# Patient Record
Sex: FEMALE | Race: WHITE | NOT HISPANIC OR LATINO | Employment: OTHER | URBAN - METROPOLITAN AREA
[De-identification: names, ages, dates, MRNs, and addresses within clinical notes are randomized per-mention and may not be internally consistent; named-entity substitution may affect disease eponyms.]

---

## 2024-08-06 ENCOUNTER — HOSPITAL ENCOUNTER (INPATIENT)
Facility: CLINIC | Age: 89
LOS: 5 days | Discharge: NURSING, REHAB OR LONG-TERM CARE | DRG: 522 | End: 2024-08-11
Attending: EMERGENCY MEDICINE | Admitting: HOSPITALIST
Payer: COMMERCIAL

## 2024-08-06 ENCOUNTER — APPOINTMENT (OUTPATIENT)
Dept: GENERAL RADIOLOGY | Facility: CLINIC | Age: 89
DRG: 522 | End: 2024-08-06
Attending: EMERGENCY MEDICINE
Payer: COMMERCIAL

## 2024-08-06 ENCOUNTER — APPOINTMENT (OUTPATIENT)
Dept: GENERAL RADIOLOGY | Facility: CLINIC | Age: 89
DRG: 522 | End: 2024-08-06
Attending: PHYSICIAN ASSISTANT
Payer: COMMERCIAL

## 2024-08-06 DIAGNOSIS — S32.592A CLOSED FRACTURE OF RAMUS OF LEFT PUBIS, INITIAL ENCOUNTER (H): ICD-10-CM

## 2024-08-06 DIAGNOSIS — S72.002A CLOSED DISPLACED FRACTURE OF LEFT FEMORAL NECK (H): ICD-10-CM

## 2024-08-06 DIAGNOSIS — E87.1 HYPONATREMIA: ICD-10-CM

## 2024-08-06 DIAGNOSIS — S72.002A CLOSED FRACTURE OF NECK OF LEFT FEMUR, INITIAL ENCOUNTER (H): Primary | ICD-10-CM
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
## 2024-08-06 PROBLEM — M80.00XD AGE-RELATED OSTEOPOROSIS WITH CURRENT PATHOLOGICAL FRACTURE WITH ROUTINE HEALING: Status: ACTIVE | Noted: 2024-08-06
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
## 2024-08-06 PROBLEM — S72.102D: Status: ACTIVE | Noted: 2024-08-06

## 2024-08-06 LAB
ABO/RH(D): NORMAL
ANION GAP SERPL CALCULATED.3IONS-SCNC: 12 MMOL/L (ref 7–15)
ANTIBODY SCREEN: NEGATIVE
ATRIAL RATE - MUSE: 97 BPM
BASOPHILS # BLD AUTO: 0 10E3/UL (ref 0–0.2)
BASOPHILS NFR BLD AUTO: 0 %
BUN SERPL-MCNC: 15.9 MG/DL (ref 8–23)
CALCIUM SERPL-MCNC: 9 MG/DL (ref 8.8–10.4)
CHLORIDE SERPL-SCNC: 92 MMOL/L (ref 98–107)
CREAT SERPL-MCNC: 0.59 MG/DL (ref 0.51–0.95)
DIASTOLIC BLOOD PRESSURE - MUSE: NORMAL MMHG
EGFRCR SERPLBLD CKD-EPI 2021: 85 ML/MIN/1.73M2
EOSINOPHIL # BLD AUTO: 0.1 10E3/UL (ref 0–0.7)
EOSINOPHIL NFR BLD AUTO: 1 %
ERYTHROCYTE [DISTWIDTH] IN BLOOD BY AUTOMATED COUNT: 13.7 % (ref 10–15)
GLUCOSE SERPL-MCNC: 142 MG/DL (ref 70–99)
HCO3 SERPL-SCNC: 23 MMOL/L (ref 22–29)
HCT VFR BLD AUTO: 41.8 % (ref 35–47)
HGB BLD-MCNC: 14.5 G/DL (ref 11.7–15.7)
IMM GRANULOCYTES # BLD: 0.1 10E3/UL
IMM GRANULOCYTES NFR BLD: 1 %
INTERPRETATION ECG - MUSE: NORMAL
LYMPHOCYTES # BLD AUTO: 0.9 10E3/UL (ref 0.8–5.3)
LYMPHOCYTES NFR BLD AUTO: 8 %
MCH RBC QN AUTO: 30.3 PG (ref 26.5–33)
MCHC RBC AUTO-ENTMCNC: 34.7 G/DL (ref 31.5–36.5)
MCV RBC AUTO: 87 FL (ref 78–100)
MONOCYTES # BLD AUTO: 0.9 10E3/UL (ref 0–1.3)
MONOCYTES NFR BLD AUTO: 8 %
NEUTROPHILS # BLD AUTO: 9.8 10E3/UL (ref 1.6–8.3)
NEUTROPHILS NFR BLD AUTO: 84 %
NRBC # BLD AUTO: 0 10E3/UL
NRBC BLD AUTO-RTO: 0 /100
P AXIS - MUSE: 75 DEGREES
PLATELET # BLD AUTO: 182 10E3/UL (ref 150–450)
POTASSIUM SERPL-SCNC: 3.8 MMOL/L (ref 3.4–5.3)
PR INTERVAL - MUSE: 140 MS
QRS DURATION - MUSE: 84 MS
QT - MUSE: 362 MS
QTC - MUSE: 459 MS
R AXIS - MUSE: 32 DEGREES
RBC # BLD AUTO: 4.79 10E6/UL (ref 3.8–5.2)
SODIUM SERPL-SCNC: 127 MMOL/L (ref 135–145)
SPECIMEN EXPIRATION DATE: NORMAL
SYSTOLIC BLOOD PRESSURE - MUSE: NORMAL MMHG
T AXIS - MUSE: 49 DEGREES
TSH SERPL DL<=0.005 MIU/L-ACNC: 5.39 UIU/ML (ref 0.3–4.2)
VENTRICULAR RATE- MUSE: 97 BPM
VIT D+METAB SERPL-MCNC: 119 NG/ML (ref 20–50)
WBC # BLD AUTO: 11.8 10E3/UL (ref 4–11)

## 2024-08-06 PROCEDURE — 99222 1ST HOSP IP/OBS MODERATE 55: CPT | Performed by: PHYSICIAN ASSISTANT

## 2024-08-06 PROCEDURE — 93005 ELECTROCARDIOGRAM TRACING: CPT

## 2024-08-06 PROCEDURE — 80048 BASIC METABOLIC PNL TOTAL CA: CPT | Performed by: EMERGENCY MEDICINE

## 2024-08-06 PROCEDURE — 86900 BLOOD TYPING SEROLOGIC ABO: CPT | Performed by: PHYSICIAN ASSISTANT

## 2024-08-06 PROCEDURE — 82306 VITAMIN D 25 HYDROXY: CPT | Performed by: PHYSICIAN ASSISTANT

## 2024-08-06 PROCEDURE — 85025 COMPLETE CBC W/AUTO DIFF WBC: CPT | Performed by: EMERGENCY MEDICINE

## 2024-08-06 PROCEDURE — 250N000013 HC RX MED GY IP 250 OP 250 PS 637: Performed by: PHYSICIAN ASSISTANT

## 2024-08-06 PROCEDURE — 71045 X-RAY EXAM CHEST 1 VIEW: CPT

## 2024-08-06 PROCEDURE — 84443 ASSAY THYROID STIM HORMONE: CPT | Performed by: PHYSICIAN ASSISTANT

## 2024-08-06 PROCEDURE — 36415 COLL VENOUS BLD VENIPUNCTURE: CPT | Performed by: PHYSICIAN ASSISTANT

## 2024-08-06 PROCEDURE — 120N000001 HC R&B MED SURG/OB

## 2024-08-06 PROCEDURE — 72170 X-RAY EXAM OF PELVIS: CPT

## 2024-08-06 PROCEDURE — 99285 EMERGENCY DEPT VISIT HI MDM: CPT | Mod: 25

## 2024-08-06 PROCEDURE — 73552 X-RAY EXAM OF FEMUR 2/>: CPT | Mod: LT

## 2024-08-06 RX ORDER — ONDANSETRON 2 MG/ML
4 INJECTION INTRAMUSCULAR; INTRAVENOUS EVERY 6 HOURS PRN
Status: DISCONTINUED | OUTPATIENT
Start: 2024-08-06 | End: 2024-08-06

## 2024-08-06 RX ORDER — HYDRALAZINE HYDROCHLORIDE 20 MG/ML
10 INJECTION INTRAMUSCULAR; INTRAVENOUS EVERY 4 HOURS PRN
Status: DISCONTINUED | OUTPATIENT
Start: 2024-08-06 | End: 2024-08-06

## 2024-08-06 RX ORDER — GABAPENTIN 100 MG/1
200 CAPSULE ORAL 3 TIMES DAILY
COMMUNITY

## 2024-08-06 RX ORDER — AMOXICILLIN 250 MG
1 CAPSULE ORAL 2 TIMES DAILY PRN
Status: DISCONTINUED | OUTPATIENT
Start: 2024-08-06 | End: 2024-08-11 | Stop reason: HOSPADM

## 2024-08-06 RX ORDER — ACETAMINOPHEN 325 MG/1
650 TABLET ORAL
Status: DISCONTINUED | OUTPATIENT
Start: 2024-08-06 | End: 2024-08-11 | Stop reason: HOSPADM

## 2024-08-06 RX ORDER — HYDROMORPHONE HYDROCHLORIDE 1 MG/ML
0.3 INJECTION, SOLUTION INTRAMUSCULAR; INTRAVENOUS; SUBCUTANEOUS EVERY 6 HOURS PRN
Status: DISCONTINUED | OUTPATIENT
Start: 2024-08-06 | End: 2024-08-07

## 2024-08-06 RX ORDER — AMLODIPINE BESYLATE 10 MG/1
10 TABLET ORAL DAILY
Status: DISCONTINUED | OUTPATIENT
Start: 2024-08-07 | End: 2024-08-11 | Stop reason: HOSPADM

## 2024-08-06 RX ORDER — LIDOCAINE 40 MG/G
CREAM TOPICAL
Status: DISCONTINUED | OUTPATIENT
Start: 2024-08-06 | End: 2024-08-07

## 2024-08-06 RX ORDER — HYDROMORPHONE HCL IN WATER/PF 6 MG/30 ML
0.2 PATIENT CONTROLLED ANALGESIA SYRINGE INTRAVENOUS
Status: DISCONTINUED | OUTPATIENT
Start: 2024-08-06 | End: 2024-08-07

## 2024-08-06 RX ORDER — HYDROMORPHONE HYDROCHLORIDE 2 MG/1
2 TABLET ORAL EVERY 4 HOURS PRN
Status: DISCONTINUED | OUTPATIENT
Start: 2024-08-06 | End: 2024-08-10 | Stop reason: ALTCHOICE

## 2024-08-06 RX ORDER — HYDRALAZINE HYDROCHLORIDE 10 MG/1
10 TABLET, FILM COATED ORAL EVERY 4 HOURS PRN
Status: DISCONTINUED | OUTPATIENT
Start: 2024-08-06 | End: 2024-08-11 | Stop reason: HOSPADM

## 2024-08-06 RX ORDER — ONDANSETRON 4 MG/1
4 TABLET, ORALLY DISINTEGRATING ORAL EVERY 6 HOURS PRN
Status: DISCONTINUED | OUTPATIENT
Start: 2024-08-06 | End: 2024-08-11 | Stop reason: HOSPADM

## 2024-08-06 RX ORDER — PROCHLORPERAZINE MALEATE 5 MG
5 TABLET ORAL EVERY 6 HOURS PRN
Status: DISCONTINUED | OUTPATIENT
Start: 2024-08-06 | End: 2024-08-11 | Stop reason: HOSPADM

## 2024-08-06 RX ORDER — GABAPENTIN 100 MG/1
200 CAPSULE ORAL 3 TIMES DAILY
Status: DISCONTINUED | OUTPATIENT
Start: 2024-08-06 | End: 2024-08-11 | Stop reason: HOSPADM

## 2024-08-06 RX ORDER — LEVOTHYROXINE SODIUM 100 UG/1
100 TABLET ORAL DAILY
Status: DISCONTINUED | OUTPATIENT
Start: 2024-08-07 | End: 2024-08-11 | Stop reason: HOSPADM

## 2024-08-06 RX ORDER — ONDANSETRON 4 MG/1
4 TABLET, ORALLY DISINTEGRATING ORAL EVERY 6 HOURS PRN
Status: DISCONTINUED | OUTPATIENT
Start: 2024-08-06 | End: 2024-08-06

## 2024-08-06 RX ORDER — LEVOTHYROXINE SODIUM 100 UG/1
100 TABLET ORAL DAILY
COMMUNITY

## 2024-08-06 RX ORDER — CALCIUM CARBONATE 500 MG/1
1000 TABLET, CHEWABLE ORAL 4 TIMES DAILY PRN
Status: DISCONTINUED | OUTPATIENT
Start: 2024-08-06 | End: 2024-08-11 | Stop reason: HOSPADM

## 2024-08-06 RX ORDER — HYDROXYZINE HYDROCHLORIDE 10 MG/1
10 TABLET, FILM COATED ORAL 3 TIMES DAILY PRN
Status: DISCONTINUED | OUTPATIENT
Start: 2024-08-06 | End: 2024-08-06

## 2024-08-06 RX ORDER — AMOXICILLIN 250 MG
2 CAPSULE ORAL 2 TIMES DAILY PRN
Status: DISCONTINUED | OUTPATIENT
Start: 2024-08-06 | End: 2024-08-11 | Stop reason: HOSPADM

## 2024-08-06 RX ORDER — AMLODIPINE BESYLATE 10 MG/1
10 TABLET ORAL DAILY
COMMUNITY

## 2024-08-06 RX ORDER — HYDRALAZINE HYDROCHLORIDE 20 MG/ML
10 INJECTION INTRAMUSCULAR; INTRAVENOUS EVERY 6 HOURS PRN
Status: DISCONTINUED | OUTPATIENT
Start: 2024-08-06 | End: 2024-08-11 | Stop reason: HOSPADM

## 2024-08-06 RX ORDER — PROCHLORPERAZINE 25 MG
12.5 SUPPOSITORY, RECTAL RECTAL EVERY 12 HOURS PRN
Status: DISCONTINUED | OUTPATIENT
Start: 2024-08-06 | End: 2024-08-11 | Stop reason: HOSPADM

## 2024-08-06 RX ORDER — METHOCARBAMOL 500 MG/1
250 TABLET ORAL 3 TIMES DAILY PRN
Status: DISCONTINUED | OUTPATIENT
Start: 2024-08-06 | End: 2024-08-11 | Stop reason: HOSPADM

## 2024-08-06 RX ORDER — ACETAMINOPHEN 650 MG/1
650 SUPPOSITORY RECTAL
Status: DISCONTINUED | OUTPATIENT
Start: 2024-08-06 | End: 2024-08-11 | Stop reason: HOSPADM

## 2024-08-06 RX ORDER — FENTANYL CITRATE 50 UG/ML
100 INJECTION, SOLUTION INTRAMUSCULAR; INTRAVENOUS ONCE
Status: DISCONTINUED | OUTPATIENT
Start: 2024-08-06 | End: 2024-08-07

## 2024-08-06 RX ORDER — ONDANSETRON 2 MG/ML
4 INJECTION INTRAMUSCULAR; INTRAVENOUS EVERY 6 HOURS PRN
Status: DISCONTINUED | OUTPATIENT
Start: 2024-08-06 | End: 2024-08-11 | Stop reason: HOSPADM

## 2024-08-06 RX ADMIN — GABAPENTIN 200 MG: 100 CAPSULE ORAL at 20:56

## 2024-08-06 RX ADMIN — ACETAMINOPHEN 650 MG: 325 TABLET, FILM COATED ORAL at 20:56

## 2024-08-06 ASSESSMENT — ACTIVITIES OF DAILY LIVING (ADL)
ADLS_ACUITY_SCORE: 35
ADLS_ACUITY_SCORE: 33
ADLS_ACUITY_SCORE: 35
ADLS_ACUITY_SCORE: 33
ADLS_ACUITY_SCORE: 35
ADLS_ACUITY_SCORE: 25
ADLS_ACUITY_SCORE: 33
ADLS_ACUITY_SCORE: 35
ADLS_ACUITY_SCORE: 35
ADLS_ACUITY_SCORE: 33
ADLS_ACUITY_SCORE: 35
ADLS_ACUITY_SCORE: 35

## 2024-08-06 NOTE — ED NOTES
"[facility]  ED Nurse Handoff Report    ED Chief complaint: Hip Pain      ED Diagnosis:   Final diagnoses:   Closed fracture of neck of left femur, initial encounter (H)       Code Status: Full Code    Allergies:   Allergies   Allergen Reactions    Morphine And Codeine      States she almost passed out from medicine     Sulfa Antibiotics      Unknown reaction       Patient Story: pt fell in her home with left hip pain   Focused Assessment:  pt has a fx of left femur neck and will have surgery tomorrow     Treatments and/or interventions provided: supportive   Patient's response to treatments and/or interventions: well     To be done/followed up on inpatient unit:  please call son for an update     Does this patient have any cognitive concerns?: Forgetful    Activity level - Baseline/Home:  Walker  Activity Level - Current:   Total Care    Patient's Preferred language: English   Needed?: No    Isolation: None  Infection: Not Applicable  Patient tested for COVID 19 prior to admission: NO  Bariatric?: No    Vital Signs:   Vitals:    08/06/24 1036 08/06/24 1507   BP: 127/68 (!) 137/90   Pulse: 68    Resp: 18 19   Temp: 98.3  F (36.8  C)    TempSrc: Temporal    SpO2: 96% 92%   Weight: 57.6 kg (127 lb)    Height: 1.626 m (5' 4\")        Cardiac Rhythm:     Was the PSS-3 completed:   yes  What interventions are required if any?               Family Comments: Please call son for updates   OBS brochure/video discussed/provided to patient/family: No              Name of person given brochure if not patient:               Relationship to patient:     For the majority of the shift this patient's behavior was Green.   Behavioral interventions performed were .    ED NURSE PHONE NUMBER: *[phone]         "

## 2024-08-06 NOTE — H&P
[facility]  History and Physical - Hospitalist Service  Date of Admission:  8/6/2024  PRIMARY CARE PROVIDER:  Noreen Victor    Assessment & Plan           Assessment/Plan:      Glo Gallagher is a 89 year old female , with a PMH of osteoporosis, hypothyroidism, neuropathy, prediabetes, cognitive decline, who was admitted on 8/6/2024.,  After an accidental fall out of bed, with the left hip and pelvic fracture.    Per Ed MD, fall out of bed while trying to stand 8/5,  put her back into bed, stayed there x1d, presented to ED 8/6. XR w L hip and pelvic fx. Ortho plans for repair 8/7.      Principal Problem:    Closed trochanteric fracture of left femur with routine healing  Active Problems:    Osteoporosis    Hypertension goal BP (blood pressure) < 140/90    Fall as cause of accidental injury in home as place of occurrence, initial encounter    Closed fracture of multiple pubic rami, left, initial encounter (H)    Age-related osteoporosis with current pathological fracture with routine healing      Accidental fall, at home  Left femoral neck fracture with impaction, trauma related, osteoporosis related  Left superior/inferior pubic rami fractures,  Osteoporosis  Fell standing near closet (per pt) , NOT out of bed , reports leg gave way on 8/5,  put her back into bed, stayed there x1d, presented to ED 8/6. XR w L hip and sup. since and inf pelvic rami fx. Ortho plans for repair 8/7.  Suspect fractures are secondary to her osteoporosis + trauma.  No contraindications to proceed with repair on 8/7 as benefits>risks. Pain well controlled., caution w meds /SE risk on opiates.  -Admit inpatient  -orthopedics consulted  -N.p.o. at midnight  -Operative repair planned for 8/7  -Bedrest until repair  -Pain control with cold packs, acetaminophen, limit NSAIDs, low-dose as needed hydromorphone ,PO 1st, IV second prn, use lowest dose needed.  -Screening CXR, EKG  -AM CBC, BMP  -PTA  bisphosphonate held, resume per orthopedics    Hypothyroidism  , last was low 0.10..  -Recheck TSH 5.39, recheck in 4 wks  --PCP may need to adjust synthroid    Hypertension  On admission 137/90 here  -PTA amlodipine 10 daily resumed  -Hydralazine IV prn SBP consistently >160  -  History of cognitive impairment  -No PTA meds, intolerant past Aricept  -At risk of delirium  -As needed melatonin at bedtime  -Assure good sleep-wake cycles    History of prediabetes, peripheral neuropathy  -Last HgbA1c 6.1% 10/2023, no PTA meds.    -PCP follow-up  -PTA gabapentin 200 3 times daily    Clinically Significant Risk Factors Present on Admission                # Drug Induced Platelet Defect: home medication list includes an antiplatelet medication   # Hypertension: Noted on problem list                         Diet: NPO per Anesthesia Guidelines for Procedure/Surgery Except for: Meds, Ice Chips    DVT Prophylaxis: Pneumatic Compression Devices  Ortiz Catheter: Not present  Lines: None     Cardiac Monitoring: None  Code Status:  DNR/DNI per pt (clear re wishes)  Disposition: Inpt, expect > 2 midnights , surgery  Disposition Plan      The patient has been discussed in detail with the [facility] hospitalist, who agrees with the assessment and plan as noted  The patient's care was discussed with the Attending Physician,   , Bedside Nurse, Patient, and ED MD .  Call to son unanswered, no msg left  Call to  ([phone]) also not answered  Entered: Mendoza Barone PA-C 08/06/2024, 4:40 PM       Mendoza Barone PA-C  [facility]  Securely message with the Vocera Web Console (learn more here)  Text page via [employer] Paging/Directory     ____________________________________________________________________    Chief Complaint         Subjective:    L hip pain    History of Present Illness   History is obtained from the ED provider, patient and EMR.  Pt is considered a mostly reliable historian, has mild cog  impairment, recall re events seems intact, but is not assured and /family report would help. No answer in calls to son or  at home to confirm hx      Patient pleasantly reports that she is feeling okay.  At present she reports her pain is well-controlled and not at rest.  Locates it over the left lateral hip.  She does tell me about the fall but states she was up at her closet when she fell.  Unsure why she fell.  Fell onto her left hip.  Apparently did not hurt too much.  She was assisted back in bed by her  per her report.  Did not report any prodromal symptoms.  No chest pain shortness of breath.  No abdominal symptoms.  No UTI or dysuria symptoms.  No other new symptoms were endorsed.    Past medical history briefly reviewed.  She does indicate she has some memory difficulties,-Is aware of the correct 2024 and that she is at a hospital.  Struggles to remember [place].    I tried to call the son and the  but neither would  and unable to leave a message.    Past Medical History    I have reviewed this patient's medical history and updated it with pertinent information if needed.   Past Medical History:   Diagnosis Date    Allergic rhinitis, cause unspecified     Allergic rhinitis, cause unspecified     Hypertension goal BP (blood pressure) < 140/90     Lumbago     Osteopenia     11/3.3 % FRAX     thyroid [794.6]     thyroid     Prior to Admission Medications   Prior to Admission Medications   Prescriptions Last Dose Informant Patient Reported? Taking?   AMLODIPINE BESYLATE 5 MG PO TABS   No No   Si TABLET DAILY   ASPIRIN 81 MG OR TABS   Yes No   Si tab po QD (Once per day)   BENADRYL 25 MG OR TABS   Yes No   Si TABLET EVERY 4 TO 6 HOURS AS NEEDED   CHLOR-TABLETS 4 MG OR TABS   No No   Si TABLET TWICE DAILY AS NEEDED   CRANBERRY   Yes No   Simg daily   LEVOTHYROXINE SODIUM 88 MCG PO TABS   No No   Si TABLET DAILY   ORDER FOR DME   No No  "  Sig: \"superfeet\" orthotics: [place]   OVER-THE-COUNTER   Yes No   Sig: STEPHANY POT AS NEEDED FOR ALLERGIES   VITAMIN D 1000 UNIT PO TABS   Yes No   Si TABLETS DAILY      Facility-Administered Medications: None     Allergies   Allergies   Allergen Reactions    Morphine And Codeine      States she almost passed out from medicine     Sulfa Antibiotics      Unknown reaction           Physical Exam                Objective/Exam:      Vital Signs: Temp: 98.3  F (36.8  C) Temp src: Temporal BP: (!) 137/90 Pulse: 68   Resp: 19 SpO2: 92 % O2 Device: None (Room air)    Vitals:    24 1036 24 1507   BP: 127/68 (!) 137/90   Pulse: 68    Resp: 18 19   Temp: 98.3  F (36.8  C)    TempSrc: Temporal    SpO2: 96% 92%   Weight: 57.6 kg (127 lb)    Height: 1.626 m (5' 4\")      Weight: 127 lbs 0 oz    Lines/monitoring: piv  Constitutional: Awake, alert, cooperative, no apparent distress.    ENT: Normocephalic, Normal sclera.    Neck: no jvd, nontender.  Pulmonary: on RA, no increased work of breathing, good air exchange, clear to auscultation bilaterally, no crackles or wheezing.  Cardiovascular: Regular rate and rhythm, normal S1 and S2,  no murmur noted.  GI: soft, non-distended, non-tender.  Thin  habitus.  : has a Purewick    Extremities:   Moderate TTP over L greater troch, able to wiggle toes. Move L foot. No lower extremity edema noted, and calves are non-tender to palpation bilaterally. Feet warm  Skin/Integumen: Visualized skin w/o infectious signs, warm, dry.  Neuro: Nonfocal,  no facial droop  Psych:  Alert and oriented x 3 but memory w slight impairments. Calm, pleasant affect.    Medical Decision Making       Please see A&P for additional details of medical decision making.  MANAGEMENT DISCUSSED with the following over the past 24 hours:     NOTE(S)/MEDICAL RECORDS REVIEWED over the past 24 hours:          Data   Data reviewed today: I reviewed all medications, new labs and imaging results over the " last 24 hours.           Labs:        Recent Labs   Lab Test 08/06/24  1647   WBC 11.8*   HGB 14.5   MCV 87      *   POTASSIUM 3.8   CHLORIDE 92*   CO2 23   ANIONGAP 12   *   BUN 15.9   CR 0.59   GFRESTIMATED 85   NGOC 9.0              Imaging:      I personally reviewed and interpreted the CXR radiograph imaging, and agree w main radiology interpretation as noted below     XR Chest 1 View  Result Date: 8/6/2024  EXAM: XR CHEST 1 VIEW LOCATION: [facility] DATE: 8/6/2024 INDICATION: preop screen COMPARISON: None.   IMPRESSION: Heart and mediastinal size normal. The lungs are hyper expanded which can be seen with COPD. Lungs and pleural spaces are clear. No pleural effusion or pneumothorax.    XR Femur Left 2 Views  Result Date: 8/6/2024  EXAM: XR FEMUR LEFT 2 VIEWS LOCATION: [facility] DATE: 8/6/2024 INDICATION: Left femoral neck fracture, full length x rays for surgical planning COMPARISON: None.   IMPRESSION: Acute mildly displaced subcapital fracture of the left femoral neck. The trochanters remain intact. Bones are demineralized. Maintained joint spacing in the left hip. Moderate-advanced tricompartmental degenerative arthritic changes in the left knee.    XR Pelvis 1/2 Views  Result Date: 8/6/2024  XR PELVIS 1/2 VIEWS  8/6/2024 3:28 PM HISTORY: fall, hip pain COMPARISON: None   IMPRESSION: Acute nondisplaced fractures of the left superior and inferior pubic rami. Acute appearing left femoral neck fracture with impaction. There is normal joint alignment. Mild bilateral hip joint degenerative changes. Moderate bilateral sacroiliac joint degenerative changes. Severe degenerative changes of the lower lumbar spine. Osteopenia. NOTE: ABNORMAL REPORT THE DICTATION ABOVE DESCRIBES AN ABNORMAL REPORT FOR WHICH FOLLOW-UP IS NEEDED. MADDIE SAWYER MD   SYSTEM ID:  [id number]              ECG Interpretation:    Interpreted by Mendoza Barone PA-C,   date 8/6/2024    Sinus, no ectopy, rate 97, No Q waves, no new ST depression/elevation/ acute strain or acute ischemic signs. Normal QTc interval,    ------------------------- PAST 24 HR DATA REVIEWED -----------------------------------------------    I have personally reviewed the following data over the past 24 hrs:    11.8 (H)  \   14.5   / 182     127 (L) 92 (L) 15.9 /  142 (H)   3.8 23 0.59 \     TSH: 5.39 (H) T4: N/A A1C: N/A         Time - I spent 60 minutes on the above w greater than 50% spent face to face counseling and including documentation and coordination of care    Pt was staffed and discussed in detail w Dr Hwang, attending provider.The above assessment and plan is the product of our joint decision making.    The above form was partially completed using Dragon voice dictation software.  At times inadvertent word substitutions or word omissions may occur, not seen on proof read. Should any part of the documentation be unclear or otherwise contradictory, reader is encouraged to please contact me for clarification.

## 2024-08-06 NOTE — PHARMACY-ADMISSION MEDICATION HISTORY
Pharmacist Admission Medication History    Admission medication history is complete. The information provided in this note is only as accurate as the sources available at the time of the update.    Information Source(s): Patient and CareEverywhere/SureScripts via in-person    Pertinent Information:     Changes made to PTA medication list:  Added: gabapentin  Deleted: aspirin, cranberry  Changed: levothyroxine 88 mcg to 100 mcg, amlodipine 5 to 10 mg     Allergies reviewed with patient and updates made in EHR: no    Medication History Completed By: Samantha Iniguez RP 8/6/2024 4:52 PM    PTA Med List   Medication Sig Last Dose    amLODIPine (NORVASC) 10 MG tablet Take 10 mg by mouth daily 8/6/2024    BENADRYL 25 MG OR TABS Take 25 mg by mouth every 6 hours as needed for allergies Unknown    CHLOR-TABLETS 4 MG OR TABS Take 4 mg by mouth every 6 hours as needed for allergies Unknown    gabapentin (NEURONTIN) 100 MG capsule Take 200 mg by mouth 3 times daily 8/6/2024 at AM (x1)    levothyroxine (SYNTHROID/LEVOTHROID) 100 MCG tablet Take 100 mcg by mouth daily 8/6/2024    OVER-THE-COUNTER STEPHANY POT AS NEEDED FOR ALLERGIES Unknown    VITAMIN D 1000 UNIT PO TABS Take 2 tablets by mouth daily 8/6/2024

## 2024-08-06 NOTE — ED TRIAGE NOTES
Pt fell down yesterday morning on carpet, family assisted her onto her bed and pt has in been in bed since yesterday. Family reporting increased pain and unable to weight bear this morning

## 2024-08-06 NOTE — ED PROVIDER NOTES
Emergency Department Note      History of Present Illness     Chief Complaint   Hip Pain      HPI   Glo Gallagher is a 89 year old female with a history of hypertension and hypothyroidism who presents with her son for evaluation of hip pain.  Yesterday she fell when she was getting out of bed.  She believes her leg just gave out and she landed on her left hip.  She denies any prodromal symptoms or syncope.  She denies head injury.  Her elderly  helped her get back in bed where she has been for the last day and a half.  She has no pain at rest and has only taken aspirin but has significant left hip pain with movement requiring people to carry her to the bathroom which prompted her son to bring her to the emergency department today.  She denies headache, neck pain, vomiting, and vision changes.    Independent Historian   As above.    Review of External Notes   Reviewed that patient last saw primary care November 2023 for cough.  History of hypertension and thyroid disease.    Past Medical History     Medical History and Problem List   Allergic rhinitis  Hypertension  Thyroid disease  Osteopenia  Shingles    Medications   Amlodipine   Levothyroxine  Gabapentin  Donepezil  Tessalon    Surgical History   Hysterectomy  Appendectomy    Physical Exam     Patient Vitals for the past 24 hrs:   BP Temp Temp src Pulse Resp SpO2 Height Weight   08/06/24 2100 -- -- -- -- -- 92 % -- --   08/06/24 2056 -- -- -- -- -- 92 % -- --   08/06/24 2045 -- -- -- -- -- 93 % -- --   08/06/24 2030 -- -- -- -- -- 92 % -- --   08/06/24 2015 -- -- -- -- -- 93 % -- --   08/06/24 2003 109/80 -- -- -- -- -- -- --   08/06/24 2000 109/80 -- -- 107 -- -- -- --   08/06/24 1915 -- -- -- -- -- 94 % -- --   08/06/24 1900 -- -- -- -- -- 93 % -- --   08/06/24 1845 -- -- -- -- -- 94 % -- --   08/06/24 1830 -- -- -- -- -- 94 % -- --   08/06/24 1815 -- -- -- -- -- 96 % -- --   08/06/24 1800 (!) 149/71 -- -- 95 -- 95 % -- --   08/06/24 1507 (!) 137/90  "-- -- -- 19 92 % -- --   08/06/24 1036 127/68 98.3  F (36.8  C) Temporal 68 18 96 % 1.626 m (5' 4\") 57.6 kg (127 lb)     Physical Exam  General: Well-developed and well-nourished. Well appearing elderly  woman. Cooperative.  Head:  Atraumatic.  Eyes:  Conjunctivae, lids, and sclerae are normal.  ENT:    Normal nose. Moist mucous membranes.  Neck:  Supple. Normal range of motion.  CV:  Regular rate and rhythm. Normal heart sounds with no murmurs, rubs, or gallops detected.  Resp:  No respiratory distress. Clear to auscultation bilaterally without decreased breath sounds, wheezing, rales, or rhonchi.  GI:  Soft. Non-distended. Non-tender.    MS:  Decreased range of motion of the left hip secondary to pain, unable to lift leg off of cart.  No bilateral lower extremity edema.  Skin:  Warm. Non-diaphoretic. No pallor.  Neuro:  Awake and alert. Normal strength.  Psych: Normal mood and affect. Normal speech.  Vitals reviewed.    Diagnostics     Lab Results   Labs Ordered and Resulted from Time of ED Arrival to Time of ED Departure   BASIC METABOLIC PANEL - Abnormal       Result Value    Sodium 127 (*)     Potassium 3.8      Chloride 92 (*)     Carbon Dioxide (CO2) 23      Anion Gap 12      Urea Nitrogen 15.9      Creatinine 0.59      GFR Estimate 85      Calcium 9.0      Glucose 142 (*)    CBC WITH PLATELETS AND DIFFERENTIAL - Abnormal    WBC Count 11.8 (*)     RBC Count 4.79      Hemoglobin 14.5      Hematocrit 41.8      MCV 87      MCH 30.3      MCHC 34.7      RDW 13.7      Platelet Count 182      % Neutrophils 84      % Lymphocytes 8      % Monocytes 8      % Eosinophils 1      % Basophils 0      % Immature Granulocytes 1      NRBCs per 100 WBC 0      Absolute Neutrophils 9.8 (*)     Absolute Lymphocytes 0.9      Absolute Monocytes 0.9      Absolute Eosinophils 0.1      Absolute Basophils 0.0      Absolute Immature Granulocytes 0.1      Absolute NRBCs 0.0     GLUCOSE MONITOR NURSING POCT     Imaging   XR Pelvis " 1/2 Views   Final Result   IMPRESSION: Acute nondisplaced fractures of the left superior and   inferior pubic rami. Acute appearing left femoral neck fracture with   impaction. There is normal joint alignment. Mild bilateral hip joint   degenerative changes. Moderate bilateral sacroiliac joint degenerative   changes. Severe degenerative changes of the lower lumbar spine.   Osteopenia.      NOTE: ABNORMAL REPORT      THE DICTATION ABOVE DESCRIBES AN ABNORMAL REPORT FOR WHICH FOLLOW-UP   IS NEEDED.      MADDIE SAWYER MD            SYSTEM ID:  [id number]        EKG  Indication: Hip pain following a fall  Time: 1708  Rate 97 bpm. KS interval 140. QRS duration 84. QT/QTc 362/459.   Sinus rhythm with premature atrial complexes with aberrant conduction  Nonspecific T wave abnormality  Abnormal ECG   No acute ST changes.  No prior ECG compared    Independent Interpretation   I independently interpreted the pelvis x-ray and see a left hip fracture and pubic ramus fractures.    ED Course      Medications Administered   Medications   fentaNYL (PF) (SUBLIMAZE) injection 100 mcg (0 mcg Intravenous Hold 8/6/24 1938)     Discussion of Management   Admitting Hospitalist, BESSIE Barone  OrthopedicsBESSIE    ED Course   ED Course as of 08/06/24 2112   Tue Aug 06, 2024   1542 I obtained history and examined the patient as noted above.     1548 I spoke with Christina Mitchell PA-C of Orthopedics regarding the patient.   1642 I spoke with BESSIE Patel for Dr. Hwang of the hospitalist team, regarding the patient for admission.     Additional Documentation  Lives at home with 95 year old   Supportive son    Medical Decision Making / Diagnosis   LATRELL Cody is a 89 year old woman who fell yesterday when she was getting out of bed.  This sounds like a mechanical fall without syncope or head injury.  She got back into bed and did not seek care because she was not in pain if she did not move.  However, she had to be  carried to the bathroom so her son brought her in today.  She denies any other symptoms outside of primarily left hip pain.  She has decreased range of motion of the left hip without other evidence of trauma.    As expected, x-ray confirms left femoral neck fracture.  She also has nondisplaced fractures of the left superior and inferior pubic rami.  Laboratory studies are significant for hyponatremia to 127 of uncertain clinical significance.  EKG is reassuring without acute ST changes or arrhythmias.    I discussed the patient's case with Christina Shafer, orthopedics PA, who has no further orders with plan for operative intervention tomorrow.  I discussed the patient's case with Mendoza Barone, hospitalist PA, who accepts admission and has no further orders.  I updated patient and her son and answered all their questions.  They verbalized understanding and are agreeable.    Disposition   The patient was admitted to the hospital.     Diagnosis     ICD-10-CM     Closed displaced fracture of left femoral neck (H)  S72.002A        Closed fracture of ramus of left pubis, initial encounter (H)  S32.592A        Hyponatremia  E87.1          Scribe Disclosure:  I, Shey Brandon, am serving as a scribe at 3:30 PM on 8/6/2024 to document services personally performed by Najma Maldonado MD based on my observations and the provider's statements to me.        Najma Maldonado MD  08/07/24 1042

## 2024-08-06 NOTE — PLAN OF CARE
Orthopedic Plan of Care     Patient sustained a fall on 8/5/24 and was found to have a left femoral neck fracture as well as left superior and inferior pubic rami fractures today. Plan for a left hip hemiarthroplasty on 8/7/24 pending the patient is medically optimized and there is OR availability.      -Admit to the medicine team. Appreciate assistance with perioperative risk stratification and optimization.  -NPO at midnight. Okay for diet today from an orthopedic standpoint.  -NWB/bedrest.  -Hold PTA anticoagulation (none per EDMD).  -Type and screen ordered.  -Vitamin D level ordered.  -Continue pain regimen.     Formal consult to follow.     Christina Shafer PA-C  [facility]

## 2024-08-07 ENCOUNTER — ANESTHESIA (OUTPATIENT)
Dept: SURGERY | Facility: CLINIC | Age: 89
DRG: 522 | End: 2024-08-07
Payer: COMMERCIAL

## 2024-08-07 ENCOUNTER — ANESTHESIA EVENT (OUTPATIENT)
Dept: SURGERY | Facility: CLINIC | Age: 89
DRG: 522 | End: 2024-08-07
Payer: COMMERCIAL

## 2024-08-07 ENCOUNTER — APPOINTMENT (OUTPATIENT)
Dept: GENERAL RADIOLOGY | Facility: CLINIC | Age: 89
DRG: 522 | End: 2024-08-07
Attending: STUDENT IN AN ORGANIZED HEALTH CARE EDUCATION/TRAINING PROGRAM
Payer: COMMERCIAL

## 2024-08-07 LAB
ANION GAP SERPL CALCULATED.3IONS-SCNC: 11 MMOL/L (ref 7–15)
BUN SERPL-MCNC: 12 MG/DL (ref 8–23)
CALCIUM SERPL-MCNC: 8.6 MG/DL (ref 8.8–10.4)
CHLORIDE SERPL-SCNC: 92 MMOL/L (ref 98–107)
CREAT SERPL-MCNC: 0.42 MG/DL (ref 0.51–0.95)
CREAT SERPL-MCNC: 0.45 MG/DL (ref 0.51–0.95)
CREAT UR-MCNC: 40.3 MG/DL
EGFRCR SERPLBLD CKD-EPI 2021: >90 ML/MIN/1.73M2
EGFRCR SERPLBLD CKD-EPI 2021: >90 ML/MIN/1.73M2
ERYTHROCYTE [DISTWIDTH] IN BLOOD BY AUTOMATED COUNT: 13.7 % (ref 10–15)
FRACT EXCRET NA UR+SERPL-RTO: 0.2 %
GLUCOSE BLDC GLUCOMTR-MCNC: 155 MG/DL (ref 70–99)
GLUCOSE SERPL-MCNC: 120 MG/DL (ref 70–99)
HCO3 SERPL-SCNC: 23 MMOL/L (ref 22–29)
HCT VFR BLD AUTO: 38.6 % (ref 35–47)
HGB BLD-MCNC: 13.4 G/DL (ref 11.7–15.7)
INR PPP: 1.24 (ref 0.85–1.15)
MAGNESIUM SERPL-MCNC: 1.9 MG/DL (ref 1.7–2.3)
MCH RBC QN AUTO: 30.3 PG (ref 26.5–33)
MCHC RBC AUTO-ENTMCNC: 34.7 G/DL (ref 31.5–36.5)
MCV RBC AUTO: 87 FL (ref 78–100)
OSMOLALITY UR: 289 MMOL/KG (ref 100–1200)
PLATELET # BLD AUTO: 171 10E3/UL (ref 150–450)
POTASSIUM SERPL-SCNC: 3.3 MMOL/L (ref 3.4–5.3)
POTASSIUM SERPL-SCNC: 3.8 MMOL/L (ref 3.4–5.3)
RBC # BLD AUTO: 4.42 10E6/UL (ref 3.8–5.2)
SODIUM SERPL-SCNC: 126 MMOL/L (ref 135–145)
SODIUM SERPL-SCNC: 129 MMOL/L (ref 135–145)
SODIUM UR-SCNC: 20 MMOL/L
T4 FREE SERPL-MCNC: 1.26 NG/DL (ref 0.9–1.7)
WBC # BLD AUTO: 11.2 10E3/UL (ref 4–11)

## 2024-08-07 PROCEDURE — 85048 AUTOMATED LEUKOCYTE COUNT: CPT | Performed by: PHYSICIAN ASSISTANT

## 2024-08-07 PROCEDURE — 258N000003 HC RX IP 258 OP 636: Performed by: STUDENT IN AN ORGANIZED HEALTH CARE EDUCATION/TRAINING PROGRAM

## 2024-08-07 PROCEDURE — 0SRS019 REPLACEMENT OF LEFT HIP JOINT, FEMORAL SURFACE WITH METAL SYNTHETIC SUBSTITUTE, CEMENTED, OPEN APPROACH: ICD-10-PCS | Performed by: STUDENT IN AN ORGANIZED HEALTH CARE EDUCATION/TRAINING PROGRAM

## 2024-08-07 PROCEDURE — 360N000077 HC SURGERY LEVEL 4, PER MIN: Performed by: STUDENT IN AN ORGANIZED HEALTH CARE EDUCATION/TRAINING PROGRAM

## 2024-08-07 PROCEDURE — 85610 PROTHROMBIN TIME: CPT | Performed by: PHYSICIAN ASSISTANT

## 2024-08-07 PROCEDURE — 84132 ASSAY OF SERUM POTASSIUM: CPT | Performed by: HOSPITALIST

## 2024-08-07 PROCEDURE — 999N000065 XR PELVIS AND HIP PORTABLE LEFT 1 VIEW

## 2024-08-07 PROCEDURE — 710N000009 HC RECOVERY PHASE 1, LEVEL 1, PER MIN: Performed by: STUDENT IN AN ORGANIZED HEALTH CARE EDUCATION/TRAINING PROGRAM

## 2024-08-07 PROCEDURE — 370N000017 HC ANESTHESIA TECHNICAL FEE, PER MIN: Performed by: STUDENT IN AN ORGANIZED HEALTH CARE EDUCATION/TRAINING PROGRAM

## 2024-08-07 PROCEDURE — 250N000011 HC RX IP 250 OP 636: Performed by: STUDENT IN AN ORGANIZED HEALTH CARE EDUCATION/TRAINING PROGRAM

## 2024-08-07 PROCEDURE — 250N000011 HC RX IP 250 OP 636: Performed by: PHYSICIAN ASSISTANT

## 2024-08-07 PROCEDURE — 84439 ASSAY OF FREE THYROXINE: CPT | Performed by: HOSPITALIST

## 2024-08-07 PROCEDURE — 93005 ELECTROCARDIOGRAM TRACING: CPT

## 2024-08-07 PROCEDURE — 80048 BASIC METABOLIC PNL TOTAL CA: CPT | Performed by: PHYSICIAN ASSISTANT

## 2024-08-07 PROCEDURE — 84295 ASSAY OF SERUM SODIUM: CPT | Performed by: HOSPITALIST

## 2024-08-07 PROCEDURE — 27125 PARTIAL HIP REPLACEMENT: CPT | Performed by: ANESTHESIOLOGY

## 2024-08-07 PROCEDURE — 120N000001 HC R&B MED SURG/OB

## 2024-08-07 PROCEDURE — 250N000009 HC RX 250: Performed by: ANESTHESIOLOGY

## 2024-08-07 PROCEDURE — 82570 ASSAY OF URINE CREATININE: CPT | Performed by: HOSPITALIST

## 2024-08-07 PROCEDURE — C1776 JOINT DEVICE (IMPLANTABLE): HCPCS | Performed by: STUDENT IN AN ORGANIZED HEALTH CARE EDUCATION/TRAINING PROGRAM

## 2024-08-07 PROCEDURE — 27125 PARTIAL HIP REPLACEMENT: CPT | Performed by: NURSE ANESTHETIST, CERTIFIED REGISTERED

## 2024-08-07 PROCEDURE — 250N000013 HC RX MED GY IP 250 OP 250 PS 637: Performed by: STUDENT IN AN ORGANIZED HEALTH CARE EDUCATION/TRAINING PROGRAM

## 2024-08-07 PROCEDURE — C1713 ANCHOR/SCREW BN/BN,TIS/BN: HCPCS | Performed by: STUDENT IN AN ORGANIZED HEALTH CARE EDUCATION/TRAINING PROGRAM

## 2024-08-07 PROCEDURE — 93010 ELECTROCARDIOGRAM REPORT: CPT | Performed by: INTERNAL MEDICINE

## 2024-08-07 PROCEDURE — 250N000009 HC RX 250: Performed by: PHYSICIAN ASSISTANT

## 2024-08-07 PROCEDURE — 250N000013 HC RX MED GY IP 250 OP 250 PS 637: Performed by: HOSPITALIST

## 2024-08-07 PROCEDURE — 250N000011 HC RX IP 250 OP 636: Performed by: ANESTHESIOLOGY

## 2024-08-07 PROCEDURE — 258N000003 HC RX IP 258 OP 636: Performed by: HOSPITALIST

## 2024-08-07 PROCEDURE — 99100 ANES PT EXTEME AGE<1 YR&>70: CPT | Performed by: NURSE ANESTHETIST, CERTIFIED REGISTERED

## 2024-08-07 PROCEDURE — 258N000001 HC RX 258: Performed by: STUDENT IN AN ORGANIZED HEALTH CARE EDUCATION/TRAINING PROGRAM

## 2024-08-07 PROCEDURE — 82565 ASSAY OF CREATININE: CPT | Performed by: HOSPITALIST

## 2024-08-07 PROCEDURE — 83935 ASSAY OF URINE OSMOLALITY: CPT | Performed by: HOSPITALIST

## 2024-08-07 PROCEDURE — 83735 ASSAY OF MAGNESIUM: CPT | Performed by: HOSPITALIST

## 2024-08-07 PROCEDURE — 250N000009 HC RX 250: Performed by: STUDENT IN AN ORGANIZED HEALTH CARE EDUCATION/TRAINING PROGRAM

## 2024-08-07 PROCEDURE — 36415 COLL VENOUS BLD VENIPUNCTURE: CPT | Performed by: HOSPITALIST

## 2024-08-07 PROCEDURE — 258N000003 HC RX IP 258 OP 636: Performed by: ANESTHESIOLOGY

## 2024-08-07 PROCEDURE — 999N000141 HC STATISTIC PRE-PROCEDURE NURSING ASSESSMENT: Performed by: STUDENT IN AN ORGANIZED HEALTH CARE EDUCATION/TRAINING PROGRAM

## 2024-08-07 PROCEDURE — 250N000013 HC RX MED GY IP 250 OP 250 PS 637: Performed by: PHYSICIAN ASSISTANT

## 2024-08-07 PROCEDURE — 272N000001 HC OR GENERAL SUPPLY STERILE: Performed by: STUDENT IN AN ORGANIZED HEALTH CARE EDUCATION/TRAINING PROGRAM

## 2024-08-07 PROCEDURE — 36415 COLL VENOUS BLD VENIPUNCTURE: CPT | Performed by: PHYSICIAN ASSISTANT

## 2024-08-07 PROCEDURE — 99232 SBSQ HOSP IP/OBS MODERATE 35: CPT | Performed by: HOSPITALIST

## 2024-08-07 DEVICE — 132 DEGREE CEMENTED HIP STEM
Type: IMPLANTABLE DEVICE | Site: HIP | Status: FUNCTIONAL
Brand: OMNIFIT

## 2024-08-07 DEVICE — BUCK FEMORAL CEMENT RESTRICTOR 18.5MM
Type: IMPLANTABLE DEVICE | Site: HIP | Status: FUNCTIONAL
Brand: BUCK

## 2024-08-07 DEVICE — BIPOLAR COMPONENT
Type: IMPLANTABLE DEVICE | Site: HIP | Status: FUNCTIONAL
Brand: UHR

## 2024-08-07 DEVICE — LOW FRICTION ION TREATMENT
Type: IMPLANTABLE DEVICE | Site: HIP | Status: FUNCTIONAL
Brand: C-TAPER HEAD

## 2024-08-07 DEVICE — FULL DOSE BONE CEMENT, 10 PACK CATALOG NUMBER IS 6191-1-010
Type: IMPLANTABLE DEVICE | Site: HIP | Status: FUNCTIONAL
Brand: SIMPLEX

## 2024-08-07 DEVICE — UNIVERSAL DISTAL CEMENT SPACER
Type: IMPLANTABLE DEVICE | Site: HIP | Status: FUNCTIONAL
Brand: OMNIFIT

## 2024-08-07 RX ORDER — ACETAMINOPHEN 325 MG/1
975 TABLET ORAL EVERY 8 HOURS
Status: DISCONTINUED | OUTPATIENT
Start: 2024-08-07 | End: 2024-08-07

## 2024-08-07 RX ORDER — NALOXONE HYDROCHLORIDE 0.4 MG/ML
0.2 INJECTION, SOLUTION INTRAMUSCULAR; INTRAVENOUS; SUBCUTANEOUS
Status: DISCONTINUED | OUTPATIENT
Start: 2024-08-07 | End: 2024-08-11 | Stop reason: HOSPADM

## 2024-08-07 RX ORDER — POTASSIUM CHLORIDE 1500 MG/1
40 TABLET, EXTENDED RELEASE ORAL ONCE
Status: COMPLETED | OUTPATIENT
Start: 2024-08-07 | End: 2024-08-07

## 2024-08-07 RX ORDER — HYDROMORPHONE HCL IN WATER/PF 6 MG/30 ML
0.2 PATIENT CONTROLLED ANALGESIA SYRINGE INTRAVENOUS
Status: DISCONTINUED | OUTPATIENT
Start: 2024-08-07 | End: 2024-08-11 | Stop reason: HOSPADM

## 2024-08-07 RX ORDER — SODIUM CHLORIDE, SODIUM LACTATE, POTASSIUM CHLORIDE, CALCIUM CHLORIDE 600; 310; 30; 20 MG/100ML; MG/100ML; MG/100ML; MG/100ML
INJECTION, SOLUTION INTRAVENOUS CONTINUOUS
Status: DISCONTINUED | OUTPATIENT
Start: 2024-08-07 | End: 2024-08-07

## 2024-08-07 RX ORDER — SODIUM CHLORIDE 9 MG/ML
INJECTION, SOLUTION INTRAVENOUS CONTINUOUS
Status: DISCONTINUED | OUTPATIENT
Start: 2024-08-07 | End: 2024-08-07

## 2024-08-07 RX ORDER — IBUPROFEN 600 MG/1
600 TABLET, FILM COATED ORAL EVERY 6 HOURS PRN
Status: DISCONTINUED | OUTPATIENT
Start: 2024-08-07 | End: 2024-08-11 | Stop reason: HOSPADM

## 2024-08-07 RX ORDER — BUPIVACAINE HYDROCHLORIDE 7.5 MG/ML
INJECTION, SOLUTION INTRASPINAL
Status: COMPLETED | OUTPATIENT
Start: 2024-08-07 | End: 2024-08-07

## 2024-08-07 RX ORDER — SODIUM CHLORIDE 9 MG/ML
INJECTION, SOLUTION INTRAVENOUS CONTINUOUS PRN
Status: DISCONTINUED | OUTPATIENT
Start: 2024-08-07 | End: 2024-08-07

## 2024-08-07 RX ORDER — CHOLECALCIFEROL (VITAMIN D3) 50 MCG
50 TABLET ORAL DAILY
Status: DISCONTINUED | OUTPATIENT
Start: 2024-08-07 | End: 2024-08-07

## 2024-08-07 RX ORDER — HYDROMORPHONE HCL IN WATER/PF 6 MG/30 ML
0.4 PATIENT CONTROLLED ANALGESIA SYRINGE INTRAVENOUS EVERY 5 MIN PRN
Status: DISCONTINUED | OUTPATIENT
Start: 2024-08-07 | End: 2024-08-07 | Stop reason: HOSPADM

## 2024-08-07 RX ORDER — CEFAZOLIN SODIUM/WATER 2 G/20 ML
2 SYRINGE (ML) INTRAVENOUS
Status: COMPLETED | OUTPATIENT
Start: 2024-08-07 | End: 2024-08-07

## 2024-08-07 RX ORDER — ACETAMINOPHEN 325 MG/1
650 TABLET ORAL EVERY 4 HOURS PRN
Status: DISCONTINUED | OUTPATIENT
Start: 2024-08-10 | End: 2024-08-11 | Stop reason: HOSPADM

## 2024-08-07 RX ORDER — PROCHLORPERAZINE MALEATE 5 MG
5 TABLET ORAL EVERY 6 HOURS PRN
Status: DISCONTINUED | OUTPATIENT
Start: 2024-08-07 | End: 2024-08-07

## 2024-08-07 RX ORDER — ASPIRIN 81 MG/1
81 TABLET ORAL 2 TIMES DAILY
Status: DISCONTINUED | OUTPATIENT
Start: 2024-08-07 | End: 2024-08-11 | Stop reason: HOSPADM

## 2024-08-07 RX ORDER — ONDANSETRON 4 MG/1
4 TABLET, ORALLY DISINTEGRATING ORAL EVERY 30 MIN PRN
Status: DISCONTINUED | OUTPATIENT
Start: 2024-08-07 | End: 2024-08-07 | Stop reason: HOSPADM

## 2024-08-07 RX ORDER — SODIUM CHLORIDE, SODIUM LACTATE, POTASSIUM CHLORIDE, CALCIUM CHLORIDE 600; 310; 30; 20 MG/100ML; MG/100ML; MG/100ML; MG/100ML
INJECTION, SOLUTION INTRAVENOUS CONTINUOUS
Status: DISCONTINUED | OUTPATIENT
Start: 2024-08-07 | End: 2024-08-07 | Stop reason: HOSPADM

## 2024-08-07 RX ORDER — OXYCODONE HYDROCHLORIDE 5 MG/1
5 TABLET ORAL EVERY 4 HOURS PRN
Status: DISCONTINUED | OUTPATIENT
Start: 2024-08-07 | End: 2024-08-11 | Stop reason: HOSPADM

## 2024-08-07 RX ORDER — NALOXONE HYDROCHLORIDE 0.4 MG/ML
0.4 INJECTION, SOLUTION INTRAMUSCULAR; INTRAVENOUS; SUBCUTANEOUS
Status: DISCONTINUED | OUTPATIENT
Start: 2024-08-07 | End: 2024-08-11 | Stop reason: HOSPADM

## 2024-08-07 RX ORDER — VANCOMYCIN HYDROCHLORIDE 1 G/20ML
INJECTION, POWDER, LYOPHILIZED, FOR SOLUTION INTRAVENOUS PRN
Status: DISCONTINUED | OUTPATIENT
Start: 2024-08-07 | End: 2024-08-07 | Stop reason: HOSPADM

## 2024-08-07 RX ORDER — FENTANYL CITRATE 50 UG/ML
INJECTION, SOLUTION INTRAMUSCULAR; INTRAVENOUS PRN
Status: DISCONTINUED | OUTPATIENT
Start: 2024-08-07 | End: 2024-08-07

## 2024-08-07 RX ORDER — CEFAZOLIN SODIUM/WATER 2 G/20 ML
2 SYRINGE (ML) INTRAVENOUS SEE ADMIN INSTRUCTIONS
Status: DISCONTINUED | OUTPATIENT
Start: 2024-08-07 | End: 2024-08-07

## 2024-08-07 RX ORDER — ONDANSETRON 2 MG/ML
4 INJECTION INTRAMUSCULAR; INTRAVENOUS EVERY 30 MIN PRN
Status: DISCONTINUED | OUTPATIENT
Start: 2024-08-07 | End: 2024-08-07 | Stop reason: HOSPADM

## 2024-08-07 RX ORDER — LIDOCAINE 40 MG/G
CREAM TOPICAL
Status: DISCONTINUED | OUTPATIENT
Start: 2024-08-07 | End: 2024-08-11 | Stop reason: HOSPADM

## 2024-08-07 RX ORDER — ONDANSETRON 4 MG/1
4 TABLET, ORALLY DISINTEGRATING ORAL EVERY 6 HOURS PRN
Status: DISCONTINUED | OUTPATIENT
Start: 2024-08-07 | End: 2024-08-07

## 2024-08-07 RX ORDER — TRANEXAMIC ACID 10 MG/ML
1 INJECTION, SOLUTION INTRAVENOUS ONCE
Status: COMPLETED | OUTPATIENT
Start: 2024-08-07 | End: 2024-08-07

## 2024-08-07 RX ORDER — AMOXICILLIN 250 MG
1 CAPSULE ORAL 2 TIMES DAILY
Status: DISCONTINUED | OUTPATIENT
Start: 2024-08-07 | End: 2024-08-11 | Stop reason: HOSPADM

## 2024-08-07 RX ORDER — PROPOFOL 10 MG/ML
INJECTION, EMULSION INTRAVENOUS CONTINUOUS PRN
Status: DISCONTINUED | OUTPATIENT
Start: 2024-08-07 | End: 2024-08-07

## 2024-08-07 RX ORDER — EPHEDRINE SULFATE 50 MG/ML
INJECTION, SOLUTION INTRAMUSCULAR; INTRAVENOUS; SUBCUTANEOUS PRN
Status: DISCONTINUED | OUTPATIENT
Start: 2024-08-07 | End: 2024-08-07

## 2024-08-07 RX ORDER — PROPOFOL 10 MG/ML
INJECTION, EMULSION INTRAVENOUS PRN
Status: DISCONTINUED | OUTPATIENT
Start: 2024-08-07 | End: 2024-08-07

## 2024-08-07 RX ORDER — DEXAMETHASONE SODIUM PHOSPHATE 4 MG/ML
4 INJECTION, SOLUTION INTRA-ARTICULAR; INTRALESIONAL; INTRAMUSCULAR; INTRAVENOUS; SOFT TISSUE
Status: DISCONTINUED | OUTPATIENT
Start: 2024-08-07 | End: 2024-08-07 | Stop reason: HOSPADM

## 2024-08-07 RX ORDER — MAGNESIUM HYDROXIDE 1200 MG/15ML
LIQUID ORAL PRN
Status: DISCONTINUED | OUTPATIENT
Start: 2024-08-07 | End: 2024-08-07 | Stop reason: HOSPADM

## 2024-08-07 RX ORDER — FENTANYL CITRATE 0.05 MG/ML
25 INJECTION, SOLUTION INTRAMUSCULAR; INTRAVENOUS EVERY 5 MIN PRN
Status: DISCONTINUED | OUTPATIENT
Start: 2024-08-07 | End: 2024-08-07 | Stop reason: HOSPADM

## 2024-08-07 RX ORDER — BISACODYL 10 MG
10 SUPPOSITORY, RECTAL RECTAL DAILY PRN
Status: DISCONTINUED | OUTPATIENT
Start: 2024-08-10 | End: 2024-08-10

## 2024-08-07 RX ORDER — LIDOCAINE HYDROCHLORIDE 20 MG/ML
INJECTION, SOLUTION INFILTRATION; PERINEURAL PRN
Status: DISCONTINUED | OUTPATIENT
Start: 2024-08-07 | End: 2024-08-07

## 2024-08-07 RX ORDER — ONDANSETRON 2 MG/ML
INJECTION INTRAMUSCULAR; INTRAVENOUS PRN
Status: DISCONTINUED | OUTPATIENT
Start: 2024-08-07 | End: 2024-08-07

## 2024-08-07 RX ORDER — CEFAZOLIN SODIUM 2 G/100ML
2 INJECTION, SOLUTION INTRAVENOUS EVERY 8 HOURS
Status: COMPLETED | OUTPATIENT
Start: 2024-08-07 | End: 2024-08-08

## 2024-08-07 RX ORDER — NALOXONE HYDROCHLORIDE 0.4 MG/ML
0.1 INJECTION, SOLUTION INTRAMUSCULAR; INTRAVENOUS; SUBCUTANEOUS
Status: DISCONTINUED | OUTPATIENT
Start: 2024-08-07 | End: 2024-08-07 | Stop reason: HOSPADM

## 2024-08-07 RX ORDER — SODIUM CHLORIDE, SODIUM LACTATE, POTASSIUM CHLORIDE, CALCIUM CHLORIDE 600; 310; 30; 20 MG/100ML; MG/100ML; MG/100ML; MG/100ML
INJECTION, SOLUTION INTRAVENOUS CONTINUOUS
Status: DISCONTINUED | OUTPATIENT
Start: 2024-08-07 | End: 2024-08-08

## 2024-08-07 RX ORDER — OXYCODONE HYDROCHLORIDE 5 MG/1
10 TABLET ORAL EVERY 4 HOURS PRN
Status: DISCONTINUED | OUTPATIENT
Start: 2024-08-07 | End: 2024-08-11 | Stop reason: HOSPADM

## 2024-08-07 RX ORDER — POLYETHYLENE GLYCOL 3350 17 G/17G
17 POWDER, FOR SOLUTION ORAL DAILY
Status: DISCONTINUED | OUTPATIENT
Start: 2024-08-08 | End: 2024-08-11 | Stop reason: HOSPADM

## 2024-08-07 RX ORDER — HYDROMORPHONE HCL IN WATER/PF 6 MG/30 ML
0.4 PATIENT CONTROLLED ANALGESIA SYRINGE INTRAVENOUS
Status: DISCONTINUED | OUTPATIENT
Start: 2024-08-07 | End: 2024-08-11 | Stop reason: HOSPADM

## 2024-08-07 RX ORDER — FENTANYL CITRATE 0.05 MG/ML
50 INJECTION, SOLUTION INTRAMUSCULAR; INTRAVENOUS EVERY 5 MIN PRN
Status: DISCONTINUED | OUTPATIENT
Start: 2024-08-07 | End: 2024-08-07 | Stop reason: HOSPADM

## 2024-08-07 RX ORDER — HYDROMORPHONE HCL IN WATER/PF 6 MG/30 ML
0.2 PATIENT CONTROLLED ANALGESIA SYRINGE INTRAVENOUS EVERY 5 MIN PRN
Status: DISCONTINUED | OUTPATIENT
Start: 2024-08-07 | End: 2024-08-07 | Stop reason: HOSPADM

## 2024-08-07 RX ORDER — ONDANSETRON 2 MG/ML
4 INJECTION INTRAMUSCULAR; INTRAVENOUS EVERY 6 HOURS PRN
Status: DISCONTINUED | OUTPATIENT
Start: 2024-08-07 | End: 2024-08-07

## 2024-08-07 RX ADMIN — PHENYLEPHRINE HYDROCHLORIDE 200 MCG: 10 INJECTION INTRAVENOUS at 15:44

## 2024-08-07 RX ADMIN — PROPOFOL 100 MCG/KG/MIN: 10 INJECTION, EMULSION INTRAVENOUS at 15:35

## 2024-08-07 RX ADMIN — CEFAZOLIN SODIUM 2 G: 2 INJECTION, SOLUTION INTRAVENOUS at 23:37

## 2024-08-07 RX ADMIN — AMLODIPINE BESYLATE 10 MG: 10 TABLET ORAL at 08:30

## 2024-08-07 RX ADMIN — Medication 5 MG: at 16:34

## 2024-08-07 RX ADMIN — TRANEXAMIC ACID 1 G: 10 INJECTION, SOLUTION INTRAVENOUS at 16:47

## 2024-08-07 RX ADMIN — Medication 5 MG: at 16:44

## 2024-08-07 RX ADMIN — PROPOFOL 25 MG: 10 INJECTION, EMULSION INTRAVENOUS at 15:29

## 2024-08-07 RX ADMIN — SODIUM CHLORIDE, POTASSIUM CHLORIDE, SODIUM LACTATE AND CALCIUM CHLORIDE: 600; 310; 30; 20 INJECTION, SOLUTION INTRAVENOUS at 14:26

## 2024-08-07 RX ADMIN — PHENYLEPHRINE HYDROCHLORIDE 100 MCG: 10 INJECTION INTRAVENOUS at 16:05

## 2024-08-07 RX ADMIN — LIDOCAINE HYDROCHLORIDE 20 MG: 20 INJECTION, SOLUTION INFILTRATION; PERINEURAL at 15:29

## 2024-08-07 RX ADMIN — SODIUM CHLORIDE: 9 INJECTION, SOLUTION INTRAVENOUS at 10:58

## 2024-08-07 RX ADMIN — Medication 5 MG: at 16:05

## 2024-08-07 RX ADMIN — GABAPENTIN 200 MG: 100 CAPSULE ORAL at 08:30

## 2024-08-07 RX ADMIN — ACETAMINOPHEN 650 MG: 325 TABLET, FILM COATED ORAL at 08:30

## 2024-08-07 RX ADMIN — PHENYLEPHRINE HYDROCHLORIDE 150 MCG: 10 INJECTION INTRAVENOUS at 17:23

## 2024-08-07 RX ADMIN — ASPIRIN 81 MG: 81 TABLET, COATED ORAL at 20:11

## 2024-08-07 RX ADMIN — Medication 20 ML: at 14:39

## 2024-08-07 RX ADMIN — POTASSIUM CHLORIDE 40 MEQ: 1500 TABLET, EXTENDED RELEASE ORAL at 12:13

## 2024-08-07 RX ADMIN — SENNOSIDES AND DOCUSATE SODIUM 1 TABLET: 50; 8.6 TABLET ORAL at 20:11

## 2024-08-07 RX ADMIN — SODIUM CHLORIDE: 9 INJECTION, SOLUTION INTRAVENOUS at 15:54

## 2024-08-07 RX ADMIN — PHENYLEPHRINE HYDROCHLORIDE 0.5 MCG/KG/MIN: 10 INJECTION INTRAVENOUS at 15:33

## 2024-08-07 RX ADMIN — PHENYLEPHRINE HYDROCHLORIDE 100 MCG: 10 INJECTION INTRAVENOUS at 15:46

## 2024-08-07 RX ADMIN — PHENYLEPHRINE HYDROCHLORIDE 100 MCG: 10 INJECTION INTRAVENOUS at 16:00

## 2024-08-07 RX ADMIN — LEVOTHYROXINE SODIUM 100 MCG: 100 TABLET ORAL at 08:30

## 2024-08-07 RX ADMIN — Medication 2 G: at 15:35

## 2024-08-07 RX ADMIN — Medication 5 MG: at 16:26

## 2024-08-07 RX ADMIN — ONDANSETRON 4 MG: 2 INJECTION INTRAMUSCULAR; INTRAVENOUS at 15:53

## 2024-08-07 RX ADMIN — Medication 5 MG: at 16:14

## 2024-08-07 RX ADMIN — ACETAMINOPHEN 650 MG: 325 TABLET, FILM COATED ORAL at 20:11

## 2024-08-07 RX ADMIN — PHENYLEPHRINE HYDROCHLORIDE 100 MCG: 10 INJECTION INTRAVENOUS at 15:55

## 2024-08-07 RX ADMIN — BUPIVACAINE HYDROCHLORIDE IN DEXTROSE 1.6 ML: 7.5 INJECTION, SOLUTION SUBARACHNOID at 15:33

## 2024-08-07 RX ADMIN — TRANEXAMIC ACID 1 G: 10 INJECTION, SOLUTION INTRAVENOUS at 15:38

## 2024-08-07 RX ADMIN — SODIUM CHLORIDE, POTASSIUM CHLORIDE, SODIUM LACTATE AND CALCIUM CHLORIDE: 600; 310; 30; 20 INJECTION, SOLUTION INTRAVENOUS at 20:03

## 2024-08-07 RX ADMIN — GABAPENTIN 200 MG: 100 CAPSULE ORAL at 20:10

## 2024-08-07 RX ADMIN — FENTANYL CITRATE 50 MCG: 50 INJECTION INTRAMUSCULAR; INTRAVENOUS at 15:29

## 2024-08-07 ASSESSMENT — ACTIVITIES OF DAILY LIVING (ADL)
ADLS_ACUITY_SCORE: 25
ADLS_ACUITY_SCORE: 25
ADLS_ACUITY_SCORE: 30
ADLS_ACUITY_SCORE: 26
ADLS_ACUITY_SCORE: 26
ADLS_ACUITY_SCORE: 30
ADLS_ACUITY_SCORE: 33
ADLS_ACUITY_SCORE: 26
ADLS_ACUITY_SCORE: 33
ADLS_ACUITY_SCORE: 25
ADLS_ACUITY_SCORE: 26
ADLS_ACUITY_SCORE: 30
ADLS_ACUITY_SCORE: 26
ADLS_ACUITY_SCORE: 26
ADLS_ACUITY_SCORE: 33
ADLS_ACUITY_SCORE: 26
ADLS_ACUITY_SCORE: 30
ADLS_ACUITY_SCORE: 33
ADLS_ACUITY_SCORE: 25
ADLS_ACUITY_SCORE: 30
ADLS_ACUITY_SCORE: 26

## 2024-08-07 ASSESSMENT — COPD QUESTIONNAIRES: COPD: 0

## 2024-08-07 ASSESSMENT — LIFESTYLE VARIABLES: TOBACCO_USE: 0

## 2024-08-07 NOTE — PROGRESS NOTES
RECEIVING UNIT ED HANDOFF REVIEW    ED Nurse Handoff Report was reviewed by: Mercedes Enriquez RN on August 6, 2024 at 9:19 PM

## 2024-08-07 NOTE — PROGRESS NOTES
[facility]  Hospitalist Progress Note        Rick Hwang MD   08/07/2024        Interval History:        - Evaluated by orthopedics, planned for OR 8/7/24, to remain nonweightbearing with bedrest till then  - Na 127--- 126; will start NS at 50 ML per hour while NPO  - K 3.3, normal magnesium 1.9; will start K replacement protocol and monitor BMP  - TSH slightly elevated at 5.39; added free T4-- normal at 1.26         Assessment and Plan:        Glo Gallagher is a 89 year old female with a PMH of osteoporosis, hypothyroidism, neuropathy, prediabetes, cognitive decline, who was admitted on 8/6/2024 following a mechanical fall out of bed, with left hip and pelvic fracture.    She usually ambulates with the help of and apparently walker was not locked when she tried to grab it and had a fall.      Mechanical fall  Left femoral neck fracture with impaction, trauma related, osteoporosis related  Left superior/inferior pubic rami fractures,  Osteoporosis  - x-ray left femur (8/6) noted acute mildly displaced subcapital fracture of the left femoral neck  - x-ray pelvis (8/6) noted acute nondisplaced fractures of the left superior and  inferior pubic rami  - Evaluated by orthopedics, planned for OR 8/7/24, to remain nonweightbearing with bedrest till then  - in control with cold packs, PRN pain meds  - PTA bisphosphonate held, resume per orthopedics    Hyponatremia  Hypokalemia  - Na 127--- 126; will start NS at 50 ML per hour while NPO  - K 3.3, normal magnesium 1.9; will start K replacement protocol and monitor BMP     Hypothyroidism  - TSH slightly elevated at 5.39; added free T4-- normal at 1.26  - repeat TFTs in 4 to 6 weeks  - continue PTA Synthroid 100 mcg daily    Hypertension  - continue PTA amlodipine 10 mg daily  -Hydralazine IV prn SBP consistently >160    History of cognitive impairment  -No PTA meds, intolerant past Aricept  -At risk of delirium  -As needed melatonin at  "bedtime  -Assure good sleep-wake cycles     History of prediabetes, peripheral neuropathy  -Last HgbA1c 6.1% 10/2023, no PTA meds.    -PCP follow-up  -PTA gabapentin 200 3 times daily    Diet: NPO per Anesthesia Guidelines for Procedure/Surgery Except for: Meds, Ice Chips      DVT Prophylaxis: PCD boots; defer postoperative prophylaxis to orthopedics    Code status: DNR/DNI    Disposition:   Medically Ready for Discharge: Anticipated in 2-4 Days pending surgery, rehab, orthopedic clearance       Clinically Significant Risk Factors Present on Admission         # Hyponatremia: Lowest Na = 127 mmol/L in last 2 days, will monitor as appropriate              # Hypertension: Noted on problem list                             Page Me (7 am to 6 pm)    High medical complexity              Physical Exam:      Blood pressure (!) 134/93, pulse 100, temperature 97.5  F (36.4  C), temperature source Oral, resp. rate 16, height 1.626 m (5' 4\"), weight 57.6 kg (127 lb), SpO2 97%.  Vitals:    08/06/24 1036   Weight: 57.6 kg (127 lb)     Vital Signs with Ranges  Temp:  [97.5  F (36.4  C)-98.5  F (36.9  C)] 97.5  F (36.4  C)  Pulse:  [] 100  Resp:  [15-19] 16  BP: (109-151)/(68-99) 134/93  SpO2:  [92 %-97 %] 97 %  I/O's Last 24 hours  I/O last 3 completed shifts:  In: -   Out: 300 [Urine:300]    Constitutional: Alert, awake and oriented; resting comfortably in no apparent distress       Oral cavity: Moist mucosa   Cardiovascular: Normal s1 s2, regular rate and rhythm, no murmur   Lungs: B/l clear to auscultation, no wheezes or crepitations   Abdomen: Soft, nt, nd, no guarding, rigidity or rebound; BS +   LE : No edema   Musculoskeletal/Neuro Power 5/5 in all extremities; No focal neurological deficits noted   Psychiatry: normal mood and affect                Medications:        Current Facility-Administered Medications   Medication Dose Route Frequency Provider Last Rate Last Admin    acetaminophen (TYLENOL) tablet 650 mg  650 " mg Oral Q6H WA Mendoza Barone PA-C   650 mg at 08/06/24 2056    Or    acetaminophen (TYLENOL) Suppository 650 mg  650 mg Rectal Q6H WA Mendoza Barone PA-C        amLODIPine (NORVASC) tablet 10 mg  10 mg Oral Daily Mendoza Barone PA-C        fentaNYL (PF) (SUBLIMAZE) injection 100 mcg  100 mcg Intravenous Once Najma Maldonado MD        gabapentin (NEURONTIN) capsule 200 mg  200 mg Oral TID Mendoza Barone PA-C   200 mg at 08/06/24 2056    levothyroxine (SYNTHROID/LEVOTHROID) tablet 100 mcg  100 mcg Oral Daily Mendoza Barone PA-C        sodium chloride (PF) 0.9% PF flush 3 mL  3 mL Intracatheter Q8H Mendoza Barone PA-C   3 mL at 08/07/24 0358     PRN Meds:   Current Facility-Administered Medications   Medication Dose Route Frequency Provider Last Rate Last Admin    calcium carbonate (TUMS) chewable tablet 1,000 mg  1,000 mg Oral 4x Daily PRN Mendoza Barone PA-C        HOLD: ALL Anticoagulant medications until AFTER surgery   Does not apply HOLD Mendoza Barone PA-C        hydrALAZINE (APRESOLINE) injection 10 mg  10 mg Intravenous Q6H PRN Mendoza Barone PA-C        hydrALAZINE (APRESOLINE) tablet 10 mg  10 mg Oral Q4H PRN Mendoza Barone PA-C        HYDROmorphone (DILAUDID) half-tab 1 mg  1 mg Oral Q4H PRN Mendoza Barone PA-C        Or    HYDROmorphone (DILAUDID) tablet 2 mg  2 mg Oral Q4H PRN Mendoza Barone PA-C        HYDROmorphone (DILAUDID) injection 0.2 mg  0.2 mg Intravenous Q3H PRN Mendoza Barone PA-C        Or    HYDROmorphone (PF) (DILAUDID) injection 0.3 mg  0.3 mg Intravenous Q6H PRN Mendoza Barone PA-C        lidocaine (LMX4) cream   Topical Q1H PRN Mendoza Barone PA-C        lidocaine 1 % 0.1-1 mL  0.1-1 mL Other Q1H PRN Mendoza Barone PA-C        melatonin tablet 1 mg  1 mg Oral At Bedtime PRN Mendoza Barone PA-C        methocarbamol (ROBAXIN) half-tab 250 mg  250 mg Oral TID PRN Shamika Shafer PA-C         "ondansetron (ZOFRAN ODT) ODT tab 4 mg  4 mg Oral Q6H PRN Mendoza Barone PA-C        Or    ondansetron (ZOFRAN) injection 4 mg  4 mg Intravenous Q6H PRN Mendoza Barone PA-C        prochlorperazine (COMPAZINE) injection 5 mg  5 mg Intravenous Q6H PRN Mendoza Barone PA-C        Or    prochlorperazine (COMPAZINE) tablet 5 mg  5 mg Oral Q6H PRN Mendoza Barone PA-C        Or    prochlorperazine (COMPAZINE) suppository 12.5 mg  12.5 mg Rectal Q12H PRN Mendoza Barone PA-C        senna-docusate (SENOKOT-S/PERICOLACE) 8.6-50 MG per tablet 1 tablet  1 tablet Oral BID PRN Mendoza Barone PA-C        Or    senna-docusate (SENOKOT-S/PERICOLACE) 8.6-50 MG per tablet 2 tablet  2 tablet Oral BID PRN Mendoza Barone PA-C        sodium chloride (PF) 0.9% PF flush 3 mL  3 mL Intracatheter q1 min prn Mendoza Barone PA-C                Data:      All new lab and imaging data was reviewed.   Recent Labs   Lab Test 08/06/24  1647   WBC 11.8*   HGB 14.5   MCV 87         Recent Labs   Lab Test 08/06/24  1647   *   POTASSIUM 3.8   CHLORIDE 92*   CO2 23   BUN 15.9   CR 0.59   ANIONGAP 12   NGOC 9.0   *     No lab results found.    Invalid input(s): \"TROP\", \"TROPONINIES\"      "

## 2024-08-07 NOTE — ANESTHESIA PREPROCEDURE EVALUATION
Anesthesia Pre-Procedure Evaluation    Patient: Glo Gallagher   MRN: [id number] : 1933        Procedure : Procedure(s):  LEFT HIP HEMIARTHROPLASTY          Past Medical History:   Diagnosis Date    Allergic rhinitis, cause unspecified     Allergic rhinitis, cause unspecified     Hypertension goal BP (blood pressure) < 140/90     Lumbago     Osteopenia     113.3 % FRAX     thyroid [794.6]     thyroid      Past Surgical History:   Procedure Laterality Date    HYSTERECTOMY, PAP NO LONGER INDICATED      ZZC NONSPECIFIC PROCEDURE  age 44    MAURI BSO    ZZC NONSPECIFIC PROCEDURE      appendectomy    ZZC NONSPECIFIC PROCEDURE      COLONOSCOPY NEG      Allergies   Allergen Reactions    Morphine And Codeine      States she almost passed out from medicine     Sulfa Antibiotics      Unknown reaction      Social History     Tobacco Use    Smoking status: Never    Smokeless tobacco: Not on file   Substance Use Topics    Alcohol use: No      Wt Readings from Last 1 Encounters:   24 57.6 kg (127 lb)        Anesthesia Evaluation            ROS/MED HX  ENT/Pulmonary:     (+)           allergic rhinitis,                          (-) tobacco use, asthma and COPD   Neurologic:     (+)   dementia,                             Cardiovascular:     (+)  hypertension- -   -  - -                                   (-) CAD   METS/Exercise Tolerance:     Hematologic:       Musculoskeletal: Comment: Osteoporosis  (+)  arthritis,   fracture, Fracture location: LLE,         GI/Hepatic:    (-) GERD   Renal/Genitourinary:  - neg Renal ROS     Endo:     (+)  type II DM,                    Psychiatric/Substance Use:  - neg psychiatric ROS     Infectious Disease:       Malignancy:       Other:            Physical Exam    Airway        Mallampati: III   TM distance: > 3 FB   Neck ROM: full   Mouth opening: > 3 cm    Respiratory Devices and Support         Dental       (+) Modest Abnormalities - crowns, retainers, 1 or 2 missing  "teeth      Cardiovascular          Rhythm and rate: irregular     Pulmonary   pulmonary exam normal                OUTSIDE LABS:  CBC:   Lab Results   Component Value Date    WBC 11.2 (H) 08/07/2024    WBC 11.8 (H) 08/06/2024    HGB 13.4 08/07/2024    HGB 14.5 08/06/2024    HCT 38.6 08/07/2024    HCT 41.8 08/06/2024     08/07/2024     08/06/2024     BMP:   Lab Results   Component Value Date     (L) 08/07/2024     (L) 08/06/2024    POTASSIUM 3.3 (L) 08/07/2024    POTASSIUM 3.8 08/06/2024    CHLORIDE 92 (L) 08/07/2024    CHLORIDE 92 (L) 08/06/2024    CO2 23 08/07/2024    CO2 23 08/06/2024    BUN 12.0 08/07/2024    BUN 15.9 08/06/2024    CR 0.45 (L) 08/07/2024    CR 0.59 08/06/2024     (H) 08/07/2024     (H) 08/06/2024     COAGS:   Lab Results   Component Value Date    INR 1.24 (H) 08/07/2024     POC: No results found for: \"BGM\", \"HCG\", \"HCGS\"  HEPATIC:   Lab Results   Component Value Date    ALBUMIN 4.1 08/16/2010    PROTTOTAL 7.0 08/16/2010    ALT 21 08/16/2010    AST 22 08/16/2010    ALKPHOS 80 08/16/2010    BILITOTAL 0.8 08/16/2010     OTHER:   Lab Results   Component Value Date    NGOC 8.6 (L) 08/07/2024    MAG 1.9 08/07/2024    TSH 5.39 (H) 08/06/2024    T4 1.26 08/07/2024       Anesthesia Plan    ASA Status:  3    NPO Status:  NPO Appropriate    Anesthesia Type: Spinal.   Induction: Intravenous, Propofol.           Consents    Anesthesia Plan(s) and associated risks, benefits, and realistic alternatives discussed. Questions answered and patient/representative(s) expressed understanding.     - Discussed:     - Discussed with:  Patient      - Extended Intubation/Ventilatory Support Discussed: No.      - Patient is DNR/DNI Status: No     Use of blood products discussed: No .     Postoperative Care    Pain management: IV analgesics, Peripheral nerve block (Single Shot), Multi-modal analgesia.   PONV prophylaxis: Ondansetron (or other 5HT-3), Dexamethasone or Solumedrol "     Comments:               Josh Grant MD    I have reviewed the pertinent notes and labs in the chart from the past 30 days and (re)examined the patient.  Any updates or changes from those notes are reflected in this note.    # Hypokalemia: Lowest K = 3.3 mmol/L in last 2 days, will replace as needed  # Hyponatremia: Lowest Na = 126 mmol/L in last 2 days, will monitor as appropriate        # Coagulation Defect: INR = 1.24 (Ref range: 0.85 - 1.15) and/or PTT = N/A, will monitor for bleeding

## 2024-08-07 NOTE — BRIEF OP NOTE
[facility]    Brief Operative Note    Pre-operative diagnosis: Closed fracture of neck of left femur, initial encounter (H) [S72.002A]  Post-operative diagnosis Same as pre-operative diagnosis    Procedure: LEFT HIP HEMIARTHROPLASTY, Left - Hip    Surgeon: Surgeons and Role:     * Juan Ramirez MD - Primary     * Donnell Garcia PA-LACEY - Assisting  Anesthesia: Choice with Block   Estimated Blood Loss: Less than 100 ml    Drains: None  Specimens: * No specimens in log *  Findings:   None.  Complications: None.  Implants:   Implant Name Type Inv. Item Serial No.  Lot No. LRB No. Used Action   BONE CEMENT SIMPLEX FULL DOSE 6191-1-001 - [id number] Cement, Bone BONE CEMENT SIMPLEX FULL DOSE 6191-1-001  [employer] [id number] Left 2 Implanted   BONE CEMENT RESTRICTOR GUTIERREZ FEMORAL 18.5MM [id number] - [id number] Cement, Bone BONE CEMENT RESTRICTOR GUTIERREZ FEMORAL 18.5MM [id number]  [employer]-R [id number] Left 1 Implanted   IMP STEM FEMORAL KAMALA DOROTHY OMNIFIT SZ 5 132DEG 1836-4294 - [id number] Total Joint Component/Insert IMP STEM FEMORAL KAMALA DOROTHY OMNIFIT SZ 5 132DEG [id number]  [employer] [id number] Left 1 Implanted   IMP SPACER OSTEONICS DISTAL CEMENT 10MM [id number] - [id number] Metallic Hardware/[place] IMP SPACER OSTEONICS DISTAL CEMENT 10MM [id number]  [employer] [id number] Left 1 Implanted   IMP HEAD STRK FEMORAL C-TAPER COCR LFIT 28MM +0MM  - [id number] Total Joint Component/Insert IMP HEAD STRK FEMORAL C-TAPER COCR LFIT 28MM +0MM   [employer] [id number] Left 1 Implanted   IMP HEAD STRK FEMORAL UHR BIPOLAR [id number] UH1-47-28 - [id number] Total Joint Component/Insert IMP HEAD STRK FEMORAL UHR BIPOLAR [id number] UH1-47-28  [employer] [id number] Left 1 Implanted       Postoperative Plan:    To PACU in stable condition, then to floor  Ancef x 2 doses   ASA 81 BID for 6 weeks  WBAT LLE without restrictions  XR in pacu    Juan Ramirez MD on 8/7/2024 at  5:14 PM

## 2024-08-07 NOTE — PRE-PROCEDURE
Pre-op report given to Gauri. Ring to left ring finger taped on patient's finger. Son, Brandt called and updated that patient is leaving unit for surgery.

## 2024-08-07 NOTE — ANESTHESIA CARE TRANSFER NOTE
Patient: Glo Gallagher    Procedure: Procedure(s):  LEFT HIP HEMIARTHROPLASTY       Diagnosis: Closed fracture of neck of left femur, initial encounter (H) [S72.002A]  Diagnosis Additional Information: No value filed.    Anesthesia Type:   Spinal     Note:    Oropharynx: oropharynx clear of all foreign objects and spontaneously breathing  Level of Consciousness: drowsy  Oxygen Supplementation: face mask  Level of Supplemental Oxygen (L/min / FiO2): 6  Independent Airway: airway patency satisfactory and stable  Dentition: dentition unchanged  Vital Signs Stable: post-procedure vital signs reviewed and stable  Report to RN Given: handoff report given  Patient transferred to: PACU    Handoff Report: Identifed the Patient, Identified the Reponsible Provider, Reviewed the pertinent medical history, Discussed the surgical course, Reviewed Intra-OP anesthesia mangement and issues during anesthesia, Set expectations for post-procedure period and Allowed opportunity for questions and acknowledgement of understanding      Vitals:  Vitals Value Taken Time   /68 08/07/24 1720   Temp     Pulse 86 08/07/24 1723   Resp 17 08/07/24 1723   SpO2 100 % 08/07/24 1723   Vitals shown include unfiled device data.    Electronically Signed By: CARLOS Tai CRNA  August 7, 2024  5:25 PM

## 2024-08-07 NOTE — ANESTHESIA PROCEDURE NOTES
Fascia iliaca Procedure Note    Pre-Procedure   Staff -        Anesthesiologist:  Josh Grant MD       Performed By: anesthesiologist       Location: pre-op       Pre-Anesthestic Checklist: patient identified, IV checked, site marked, risks and benefits discussed, informed consent, monitors and equipment checked, pre-op evaluation, at physician/surgeon's request and post-op pain management  Timeout:       Correct Patient: Yes        Correct Procedure: Yes        Correct Site: Yes        Correct Position: Yes        Correct Laterality: Yes        Site Marked: Yes  Procedure Documentation  Procedure: Fascia iliaca       Diagnosis: SEE NOTE       Laterality: left       Patient Position: supine       Skin prep: Chloraprep       Needle Gauge: 21.        Needle Length (Inches): 4        Ultrasound guided       1. Ultrasound was used to identify targeted nerve, plexus, vascular marker, or fascial plane and place a needle adjacent to it in real-time.       2. Ultrasound was used to visualize the spread of anesthetic in close proximity to the above referenced structure.       3. A permanent image is entered into the patient's record.       4. The visualized anatomic structures appeared normal.       5. There were no apparent abnormal pathologic findings.    Assessment/Narrative         The placement was negative for: blood aspirated, painful injection and site bleeding       Paresthesias: No.       Insertion/Infusion Method: Single Shot       Complications: none    Medication(s) Administered   Bupivacaine 0.5% w/ 1:400K Epi (Injection) - Injection   20 mL - 8/7/2024 2:39:00 PM   Comments:  0.5 % bupivacaine with 1/400k epinephrine injected  Using US guidance.   1. Under ultrasound guidance, the needle was inserted and placed in close proximity to the target nerve(s).  2. Ultrasound was also used to visualize the spread of the anesthetic in close proximity to the nerve(s) being blocked.  Local anesthetic was  "administered in incremental doses, with intermittent negative aspiration.    3. The nerve(s) appeared anatomically normal.  4. There were no apparent abnormal pathological findings.  5. A permanent ultrasound image was saved in the patient's record.    The surgeon has asked me to perform this procedure to provide post operative analgesia.     Patient is advised of the risks and benefits of a peripheral nerve block. Nerve injury and intravascular injection discussed.         FOR [facility] ([facility] [place]) ONLY:   Pain Team Contact information: please page the Pain Team Via [employer]. Search \"Pain\". During daytime hours, please page the attending first. At night please page the resident first.      "

## 2024-08-07 NOTE — CONSULTS
[facility]    Orthopedic Consultation    Glo Gallagher MRN# [id number]   Age: 89 year old YOB: 1933     Date of Admission:  8/6/2024    Reason for consult: Evaluation and treatment, left femoral neck fracture       Requesting physician: Mendoza Barone PA-C        Level of consult: Consult, follow and place orders           Assessment and Plan:   Assessment:   Acute, closed, left femoral neck fracture  Acute, closed, nondisplaced fractures of the left superior and inferior pubic rami      Plan:   Left hip hemiarthroplasty is recommended for management of the left femoral neck fracture.  This procedure is scheduled and planned for later today.  I discussed with the Glo who is in agreement with this plan at this time and states understanding.  She was aware of surgical plans prior to my consultation. family is not at the bedside at this time. Dr. Ramirez will discuss the risks, benefits, and outcomes of surgery while obtaining consent.     The acute, closed and nondisplaced fractures of the left superior and inferior pubic rami are stable fractures and will be treated nonoperatively with weightbearing as tolerated.  We will continue to follow her postoperatively.  Mobilization after surgery will be encouraged    Surgeon: Dr. Ramirez  NPO effective now  NWB/bedrest until postop.  Continue pain regimen.  Muscle relaxant available for PRN use.  Anticoagulation: Hold  Vitamin D level high, not ordering supplementation at this time due to possible toxicity  Type and screen ordered.    Please contact orthopedic trauma team if any questions or concerns arise.           Chief Complaint:   Left femoral neck fracture         History of Present Illness:   Glo is a very pleasant 89-year-old female with past medical history of osteoporosis, hypothyroidism, neuropathy, prediabetes, cognitive decline who was admitted on 8/6 for fall on 8/5 resulting in left femoral neck  fracture as well as left superior inferior pubic rami fractures.  She lives at home with her , and fell while standing near her closet.  Her  was able to assist her back into the bed. Today on consultation her pain is adequately managed, she is resting comfortably and peacefully in the bed.  She has some mild cognitive decline but overall is a reliable historian. no additional orthopedic or musculoskeletal complaints at this time.  No acute distress.  She is in agreement with surgical plans later today          Past Medical History:     Past Medical History:   Diagnosis Date    Allergic rhinitis, cause unspecified     Allergic rhinitis, cause unspecified     Hypertension goal BP (blood pressure) < 140/90     Lumbago     Osteopenia     11/3.3 % FRAX 2010    thyroid [794.6]     thyroid             Past Surgical History:     Past Surgical History:   Procedure Laterality Date    HYSTERECTOMY, PAP NO LONGER INDICATED      [facility] NONSPECIFIC PROCEDURE  age 44    MAURI BSO    [facility] NONSPECIFIC PROCEDURE      appendectomy    Z NONSPECIFIC PROCEDURE  2001    COLONOSCOPY NEG             Social History:     Social History     Tobacco Use    Smoking status: Never    Smokeless tobacco: Not on file   Substance Use Topics    Alcohol use: No             Family History:     Family History   Problem Relation Age of Onset    Thyroid Disease Mother     Heart Disease Father     Hypertension Mother     Hypertension Brother     Hypertension Brother              Immunizations:     VACCINE/DOSE   Diptheria   DPT   DTAP   HBIG   Hepatitis A   Hepatitis B   HIB   Influenza   Measles   Meningococcal   MMR   Mumps   Pneumococcal   Polio   Rubella   Small Pox   TDAP   Varicella   Zoster             Allergies:     Allergies   Allergen Reactions    Morphine And Codeine      States she almost passed out from medicine     Sulfa Antibiotics      Unknown reaction             Medications:     Current Facility-Administered Medications    Medication Dose Route Frequency Provider Last Rate Last Admin    acetaminophen (TYLENOL) tablet 650 mg  650 mg Oral Q6H WA Mendoza Barone PA-C   650 mg at 08/07/24 0830    Or    acetaminophen (TYLENOL) Suppository 650 mg  650 mg Rectal Q6H WA Mendoza Barone PA-C        amLODIPine (NORVASC) tablet 10 mg  10 mg Oral Daily Mendoza Barone PA-C   10 mg at 08/07/24 0830    calcium carbonate (TUMS) chewable tablet 1,000 mg  1,000 mg Oral 4x Daily PRN Mendoza Barone PA-C        fentaNYL (PF) (SUBLIMAZE) injection 100 mcg  100 mcg Intravenous Once Najma Maldonado MD        gabapentin (NEURONTIN) capsule 200 mg  200 mg Oral TID Mendoza Barone PA-C   200 mg at 08/07/24 0830    HOLD: ALL Anticoagulant medications until AFTER surgery   Does not apply HOLD Mendoza Barone PA-C        hydrALAZINE (APRESOLINE) injection 10 mg  10 mg Intravenous Q6H PRN Mendoza Barone PA-C        hydrALAZINE (APRESOLINE) tablet 10 mg  10 mg Oral Q4H PRN Mendoza Barone PA-C        HYDROmorphone (DILAUDID) half-tab 1 mg  1 mg Oral Q4H PRN Mendoza Barone PA-C        Or    HYDROmorphone (DILAUDID) tablet 2 mg  2 mg Oral Q4H PRN Mendoza Barone PA-C        HYDROmorphone (DILAUDID) injection 0.2 mg  0.2 mg Intravenous Q3H PRN Mendoza Barone PA-C        Or    HYDROmorphone (PF) (DILAUDID) injection 0.3 mg  0.3 mg Intravenous Q6H PRN Mendoza Barone PA-C        levothyroxine (SYNTHROID/LEVOTHROID) tablet 100 mcg  100 mcg Oral Daily Mendoza Barone PA-C   100 mcg at 08/07/24 0830    lidocaine (LMX4) cream   Topical Q1H PRN Mendoza Barone PA-C        lidocaine 1 % 0.1-1 mL  0.1-1 mL Other Q1H PRN Mendoza Barone PA-C        melatonin tablet 1 mg  1 mg Oral At Bedtime PRN Mendoza Barone PA-C        methocarbamol (ROBAXIN) half-tab 250 mg  250 mg Oral TID PRN Shamika Shafer PA-C        ondansetron (ZOFRAN ODT) ODT tab 4 mg  4 mg Oral Q6H PRN Mendoza Barone PA-C        Or     ondansetron (ZOFRAN) injection 4 mg  4 mg Intravenous Q6H PRN Mendoza Barone PA-C        prochlorperazine (COMPAZINE) injection 5 mg  5 mg Intravenous Q6H PRN Mendoza Barone PA-C        Or    prochlorperazine (COMPAZINE) tablet 5 mg  5 mg Oral Q6H PRN Champine, Mendoza A, PA-C        Or    prochlorperazine (COMPAZINE) suppository 12.5 mg  12.5 mg Rectal Q12H PRN Hemantine, Mendoza A, PA-C        senna-docusate (SENOKOT-S/PERICOLACE) 8.6-50 MG per tablet 1 tablet  1 tablet Oral BID PRN Hemantine, Mendoza A, PA-C        Or    senna-docusate (SENOKOT-S/PERICOLACE) 8.6-50 MG per tablet 2 tablet  2 tablet Oral BID PRN Champine, Mendoza A, PA-C        sodium chloride (PF) 0.9% PF flush 3 mL  3 mL Intracatheter Q8H HemantineMendoza A, PA-C   3 mL at 08/07/24 0358    sodium chloride (PF) 0.9% PF flush 3 mL  3 mL Intracatheter q1 min prn Champine, Mendoza A, PA-C        sodium chloride 0.9 % infusion   Intravenous Continuous Rick Hwang MD                 Review of Systems:   CV: NEGATIVE for chest pain, palpitations or peripheral edema  C: NEGATIVE for fever, chills, change in weight  E/M: NEGATIVE for ear, mouth and throat problems  R: NEGATIVE for significant cough or SOB          Physical Exam:   All vitals have been reviewed  Patient Vitals for the past 24 hrs:   BP Temp Temp src Pulse Resp SpO2 Height Weight   08/07/24 0735 (!) 134/93 97.5  F (36.4  C) Oral 100 16 97 % -- --   08/07/24 0020 118/77 98.5  F (36.9  C) Oral 86 16 93 % -- --   08/06/24 2212 (!) 151/99 97.6  F (36.4  C) Oral 90 16 92 % -- --   08/06/24 2155 -- -- -- -- 15 94 % -- --   08/06/24 2100 125/80 -- -- 101 -- 92 % -- --   08/06/24 2056 -- -- -- -- -- 92 % -- --   08/06/24 2045 -- -- -- -- -- 93 % -- --   08/06/24 2030 -- -- -- -- -- 92 % -- --   08/06/24 2015 -- -- -- -- -- 93 % -- --   08/06/24 2003 109/80 -- -- -- -- -- -- --   08/06/24 2000 109/80 -- -- 107 -- -- -- --   08/06/24 1915 -- -- -- -- -- 94 % -- --   08/06/24 1900 --  "-- -- -- -- 93 % -- --   08/06/24 1845 -- -- -- -- -- 94 % -- --   08/06/24 1830 -- -- -- -- -- 94 % -- --   08/06/24 1815 -- -- -- -- -- 96 % -- --   08/06/24 1800 (!) 149/71 -- -- 95 -- 95 % -- --   08/06/24 1507 (!) 137/90 -- -- -- 19 92 % -- --   08/06/24 1036 127/68 98.3  F (36.8  C) Temporal 68 18 96 % 1.626 m (5' 4\") 57.6 kg (127 lb)       Intake/Output Summary (Last 24 hours) at 8/7/2024 0941  Last data filed at 8/7/2024 0444  Gross per 24 hour   Intake --   Output 300 ml   Net -300 ml       Constitutional: Pleasant, alert, appropriate, following commands.  Thin comfortably on room air  HEENT: Head atraumatic normocephalic. Pupils equal round and reactive.  Respiratory: Unlabored breathing no audible wheeze  Cardiovascular: Regular rate and rhythm per pulses.  GI: Abdomen is non-distended.  Lymph/Hematologic: No lymphadenopathy in areas examined.  Genitourinary: No sellers  Skin: No rashes, no cyanosis, no edema.  Musculoskeletal:   On examination of left lower extremity her skin is intact surrounding the left hip and distally.  She denies pain on palpation or logroll.  There is no obvious deformity, shortening or rotation of the leg.  Minimal erythema of the surrounding skin.   Bilateral calves are soft, non-tender.  Bilateral lower extremity is NVI.  Sensation intact bilateral lower extremities  5/5 motor with resisted dorsi and plantar flexion bilaterally  3+Dp pulse  There is no pain on palpation of the right lower extremity from the hip down to the knee joint into the ankle.            Data:   All laboratory data reviewed  Results for orders placed or performed during the hospital encounter of 08/06/24   XR Pelvis 1/2 Views     Status: None    Narrative    XR PELVIS 1/2 VIEWS  8/6/2024 3:28 PM     HISTORY: fall, hip pain  COMPARISON: None      Impression    IMPRESSION: Acute nondisplaced fractures of the left superior and  inferior pubic rami. Acute appearing left femoral neck fracture with  impaction. " There is normal joint alignment. Mild bilateral hip joint  degenerative changes. Moderate bilateral sacroiliac joint degenerative  changes. Severe degenerative changes of the lower lumbar spine.  Osteopenia.    NOTE: ABNORMAL REPORT    THE DICTATION ABOVE DESCRIBES AN ABNORMAL REPORT FOR WHICH FOLLOW-UP  IS NEEDED.    MADDIE SAWYER MD         SYSTEM ID:  [id number]   XR Femur Left 2 Views     Status: None    Narrative    EXAM: XR FEMUR LEFT 2 VIEWS  LOCATION: [facility]  DATE: 8/6/2024    INDICATION: Left femoral neck fracture, full length x rays for surgical planning  COMPARISON: None.      Impression    IMPRESSION: Acute mildly displaced subcapital fracture of the left femoral neck. The trochanters remain intact. Bones are demineralized. Maintained joint spacing in the left hip. Moderate-advanced tricompartmental degenerative arthritic changes in the   left knee.   XR Chest 1 View     Status: None    Narrative    EXAM: XR CHEST 1 VIEW  LOCATION: [facility]  DATE: 8/6/2024    INDICATION: preop screen  COMPARISON: None.      Impression    IMPRESSION: Heart and mediastinal size normal. The lungs are hyperexpanded which can be seen with COPD. Lungs and pleural spaces are clear. No pleural effusion or pneumothorax.   Basic metabolic panel     Status: Abnormal   Result Value Ref Range    Sodium 127 (L) 135 - 145 mmol/L    Potassium 3.8 3.4 - 5.3 mmol/L    Chloride 92 (L) 98 - 107 mmol/L    Carbon Dioxide (CO2) 23 22 - 29 mmol/L    Anion Gap 12 7 - 15 mmol/L    Urea Nitrogen 15.9 8.0 - 23.0 mg/dL    Creatinine 0.59 0.51 - 0.95 mg/dL    GFR Estimate 85 >60 mL/min/1.73m2    Calcium 9.0 8.8 - 10.4 mg/dL    Glucose 142 (H) 70 - 99 mg/dL   Vitamin D Deficiency     Status: Abnormal   Result Value Ref Range    Vitamin D, Total (25-Hydroxy) 119 (H) 20 - 50 ng/mL    Narrative    Season, race, dietary intake, and treatment affect the concentration of 25-hydroxy-Vitamin D.  Values may decrease during winter months and increase during summer months.    Vitamin D determination is routinely performed by an immunoassay specific for 25 hydroxyvitamin D3.  If an individual is on vitamin D2(ergocalciferol) supplementation, please specify 25 OH vitamin D2 and D3 level determination by LCMSMS test [id number].     CBC with platelets and differential     Status: Abnormal   Result Value Ref Range    WBC Count 11.8 (H) 4.0 - 11.0 10e3/uL    RBC Count 4.79 3.80 - 5.20 10e6/uL    Hemoglobin 14.5 11.7 - 15.7 g/dL    Hematocrit 41.8 35.0 - 47.0 %    MCV 87 78 - 100 fL    MCH 30.3 26.5 - 33.0 pg    MCHC 34.7 31.5 - 36.5 g/dL    RDW 13.7 10.0 - 15.0 %    Platelet Count 182 150 - 450 10e3/uL    % Neutrophils 84 %    % Lymphocytes 8 %    % Monocytes 8 %    % Eosinophils 1 %    % Basophils 0 %    % Immature Granulocytes 1 %    NRBCs per 100 WBC 0 <1 /100    Absolute Neutrophils 9.8 (H) 1.6 - 8.3 10e3/uL    Absolute Lymphocytes 0.9 0.8 - 5.3 10e3/uL    Absolute Monocytes 0.9 0.0 - 1.3 10e3/uL    Absolute Eosinophils 0.1 0.0 - 0.7 10e3/uL    Absolute Basophils 0.0 0.0 - 0.2 10e3/uL    Absolute Immature Granulocytes 0.1 <=0.4 10e3/uL    Absolute NRBCs 0.0 10e3/uL   TSH     Status: Abnormal   Result Value Ref Range    TSH 5.39 (H) 0.30 - 4.20 uIU/mL   CBC with platelets     Status: Abnormal   Result Value Ref Range    WBC Count 11.2 (H) 4.0 - 11.0 10e3/uL    RBC Count 4.42 3.80 - 5.20 10e6/uL    Hemoglobin 13.4 11.7 - 15.7 g/dL    Hematocrit 38.6 35.0 - 47.0 %    MCV 87 78 - 100 fL    MCH 30.3 26.5 - 33.0 pg    MCHC 34.7 31.5 - 36.5 g/dL    RDW 13.7 10.0 - 15.0 %    Platelet Count 171 150 - 450 10e3/uL   Basic metabolic panel     Status: Abnormal   Result Value Ref Range    Sodium 126 (L) 135 - 145 mmol/L    Potassium 3.3 (L) 3.4 - 5.3 mmol/L    Chloride 92 (L) 98 - 107 mmol/L    Carbon Dioxide (CO2) 23 22 - 29 mmol/L    Anion Gap 11 7 - 15 mmol/L    Urea Nitrogen 12.0 8.0 - 23.0 mg/dL    Creatinine 0.45 (L) 0.51 -  0.95 mg/dL    GFR Estimate >90 >60 mL/min/1.73m2    Calcium 8.6 (L) 8.8 - 10.4 mg/dL    Glucose 120 (H) 70 - 99 mg/dL   INR     Status: Abnormal   Result Value Ref Range    INR 1.24 (H) 0.85 - 1.15   EKG 12-lead, tracing only     Status: None   Result Value Ref Range    Systolic Blood Pressure  mmHg    Diastolic Blood Pressure  mmHg    Ventricular Rate 97 BPM    Atrial Rate 97 BPM    NC Interval 140 ms    QRS Duration 84 ms     ms    QTc 459 ms    P Axis 75 degrees    R AXIS 32 degrees    T Axis 49 degrees    Interpretation ECG       Sinus rhythm with Premature atrial complexes with Aberrant conduction  Nonspecific T wave abnormality  Abnormal ECG  No previous ECGs available  Confirmed by GENERATED REPORT, COMPUTER ([id number]),  [place] ([id number]) on 8/6/2024 5:07:26 PM     Adult Type and Screen     Status: None   Result Value Ref Range    ABO/RH(D) A POS     Antibody Screen Negative Negative    SPECIMEN EXPIRATION DATE [id number]    CBC with platelets differential     Status: Abnormal    Narrative    The following orders were created for panel order CBC with platelets differential.  Procedure                               Abnormality         Status                     ---------                               -----------         ------                     CBC with platelets and d...[[id number]]  Abnormal            Final result                 Please view results for these tests on the individual orders.   ABO/Rh type and screen     Status: None    Narrative    The following orders were created for panel order ABO/Rh type and screen.  Procedure                               Abnormality         Status                     ---------                               -----------         ------                     Adult Type and Screen[[id number]]                            Final result                 Please view results for these tests on the individual orders.          Attestation:  I have reviewed today's vital  signs, notes, medications, labs and imaging with Dr. Ramirez.  Amount of time performed on this consult: 50 minutes.    Caryn Castro PA-C  [facility]

## 2024-08-07 NOTE — ANESTHESIA PROCEDURE NOTES
"Intrathecal injection Procedure Note    Pre-Procedure   Staff -        Anesthesiologist:  Josh Grant MD       Performed By: anesthesiologist       Location: OR       Pre-Anesthestic Checklist: patient identified, IV checked, risks and benefits discussed, informed consent, monitors and equipment checked and pre-op evaluation  Timeout:       Correct Patient: Yes        Correct Procedure: Yes        Correct Site: Yes        Correct Position: Yes   Procedure Documentation  Procedure: intrathecal injection       Patient Position: RLD       Skin prep: Chloraprep       Insertion Site: L4-5. (midline approach).       Needle Gauge: 25.        Needle Length (Inches): 3.5        Spinal Needle Type: Keily-Jaciel       Introducer used       Introducer: 20 G       # of attempts: 1 and  # of redirects:     Assessment/Narrative         Paresthesias: No.       CSF fluid: clear.    Medication(s) Administered   0.75% Hyperbaric Bupivacaine (Intrathecal) - Intrathecal   1.6 mL - 8/7/2024 3:33:00 PM   Comments:  Patient tolerated well.   Pre/Post aspiration.   No complications.   0.75% Bupivacaine documented on record.      FOR [facility] ([facility]) ONLY:   Pain Team Contact information: please page the Pain Team Via [employer]. Search \"Pain\". During daytime hours, please page the attending first. At night please page the resident first.      "

## 2024-08-07 NOTE — PLAN OF CARE
Diagnosis: L hip fx, L pubic fx  POD#: surgery 8/7  Mental Status: A&Ox3, forgetful  Activity/dangle bedrest  Diet: NPO  Pain: denies  Ortiz/Voiding: purewick   02/LDA: Fito BURGOS  D/C Date: pending  Other Info: Cms intact.

## 2024-08-07 NOTE — OP NOTE
OPERATIVE NOTE    Name: Glo Gallagher  MRN: [id number]  Age: 89 year old    YOB: 1933    Date of Procedure: 8/7/2024      Pre-operative Diagnosis:   Closed left femoral neck fracture    Post-operative Diagnosis:    Same    Procedure:    left hip hemiarthroplasty  Assistant:  Donnell Garcia PA-C    A skilled first assistant was necessary for this procedure for assistance with patient positioning, prepping, draping, surgical visualization, wound closure, and application of the dressing.     Anesthesia:  1. General with block    Complications:  None    Estimated blood loss:    100 ml    Transfusions:  None    Fluids:  Per anesthesia    Specimens:  None    Components:    Silvia Zachary Stem size 5 offset  Size 47 +0 Bipolar head     Indications:     This is a 89-year-old woman who presented to the emergency department after sustaining a ground-level fall.  She was found to have a left femoral neck fracture with displacement.  Due to the nature of this injury, a left hip hemiarthroplasty was recommended to preserve her function and avoid extended bedrest with nonoperative treatment.    Informed Consent:  Preoperatively, the risks, benefits, and possible complications of the procedure were discussed. The risks discussed included but were not limited to wound healing complications, bleeding, transfusion, stiffness and dislocation and persistent pain.  We discussed VTE as well as the high morbidity/mortality with hip fractures.  All of the questions were satisfactorily answered and the patient wished to proceed with surgery to improve their lifestyle and reduce their pain.         Description of Procedure:   Glo Gallagher was encountered in the preoperative area and consent was obtained from the patient/family with the patient awake and the correct operative site was marked. The patient was then brought back to the operative suite, placed in the lateral position with the Keith hip positioners.  An axillary  roll was used.    The patient was prepped and draped in the normal sterile fashion.     Procedural Pause:   The patient's correct identity, side, site, and procedure to be performed was verified.  Intravenous Ancef was administered prior to skin incision.    Procedure details  Attention was then turned towards the patient.     An incision at the anterior third junction of the shaft of the femur centered over the greater trochanter was made.  Hemostasis was obtained with the bovie hemocautery.  The IT band was identified and cut in line with the skin incision after dissection off the underlying muscle.  A Charnley retractor was placed.  At this time the bursa was reflected off the gluteus medius muscle and the separation between the muscle and the capsule was performed bluntly.  The tendon was then released through this interval down to the level of the greater trochanter, taking care to leave a cuff of tendon for later repair.  A inverted T capsulotomy was performed while internally rotating the hip and delivering the femoral neck.  The superior and inferior leaves of the anterior capsule were tagged with #5 ethibond suture.  The capsule was retracted and retractors were placed along the femoral neck.  A recut of the neck was performed with a sagittal saw approximately 1 cm above the lesser trochanter.  A corkscrew was used to remove the femoral head. The femoral head was sized as a 47 and trialed. There was excellent fit and suction stability.   The  was then used to prepare the lateral femoral canal.  A series of broaches was used until there was good stability. This was taken up to a size 5 with the plan to cement due to the patient's bone quality.    Trial implants revealed excellent stability.  The canal was then copiously irrigated.  A canal centralizer was trialed and then sized.  A cement restrictor was placed into the canal.  Cement was prepared and the final implant was held in place until the  cement had fully cured. Excess cement was removed.  The head was impacted for a bipolar hemiarthroplasty. The joint was reduced and there was excellent stability through full arc of motion without subluxation. Leg lengths were symmetric.  Anterior capsule was repaired.  Local anesthetic was injected into the capsule and the soft tissues.   The abductor tendon was repaired to the remaining tissue as well as through bone tunnels with #5 suture in horizontal mattress fashion, and then overrun with a #1 vicryl suture.  IT band was closed with a #1 PDS strata fix deep. The deep fascia was approximated with 0-vicryl suture and the skin was closed with 2-0 vicryla and a running 3-0 Monocryl stratafix. Dermabond was applied followed by a soft dressing.    The patient was taken to pacu in stable condition without any apparent complications.      I was present for all major portions of this procedure. All sponge, needle and instrument counts were correct at the end of the case.           Postoperative plan:       The patient will begin working with therapies and will be assessed for discharge planning. She may be weight bearing as tolerated without restrictions on the operative limb. She will receive antibiotics for 24 hours while in the hospital, and should receive at least 4 weeks of postoperative DVT ppx. She will follow up in 2 weeks for repeat XR and a wound check      Juan Ramirez MD

## 2024-08-08 ENCOUNTER — APPOINTMENT (OUTPATIENT)
Dept: PHYSICAL THERAPY | Facility: CLINIC | Age: 89
DRG: 522 | End: 2024-08-08
Attending: PHYSICIAN ASSISTANT
Payer: COMMERCIAL

## 2024-08-08 LAB
ANION GAP SERPL CALCULATED.3IONS-SCNC: 9 MMOL/L (ref 7–15)
ATRIAL RATE - MUSE: 92 BPM
BUN SERPL-MCNC: 11.3 MG/DL (ref 8–23)
CALCIUM SERPL-MCNC: 7.9 MG/DL (ref 8.8–10.4)
CHLORIDE SERPL-SCNC: 99 MMOL/L (ref 98–107)
CREAT SERPL-MCNC: 0.44 MG/DL (ref 0.51–0.95)
DIASTOLIC BLOOD PRESSURE - MUSE: NORMAL MMHG
EGFRCR SERPLBLD CKD-EPI 2021: >90 ML/MIN/1.73M2
ERYTHROCYTE [DISTWIDTH] IN BLOOD BY AUTOMATED COUNT: 13.8 % (ref 10–15)
GLUCOSE SERPL-MCNC: 116 MG/DL (ref 70–99)
HCO3 SERPL-SCNC: 21 MMOL/L (ref 22–29)
HCT VFR BLD AUTO: 35.1 % (ref 35–47)
HGB BLD-MCNC: 11.6 G/DL (ref 11.7–15.7)
INTERPRETATION ECG - MUSE: NORMAL
MCH RBC QN AUTO: 29.1 PG (ref 26.5–33)
MCHC RBC AUTO-ENTMCNC: 33 G/DL (ref 31.5–36.5)
MCV RBC AUTO: 88 FL (ref 78–100)
P AXIS - MUSE: 66 DEGREES
PLATELET # BLD AUTO: 134 10E3/UL (ref 150–450)
POTASSIUM SERPL-SCNC: 3.7 MMOL/L (ref 3.4–5.3)
PR INTERVAL - MUSE: 136 MS
QRS DURATION - MUSE: 82 MS
QT - MUSE: 374 MS
QTC - MUSE: 462 MS
R AXIS - MUSE: 35 DEGREES
RBC # BLD AUTO: 3.98 10E6/UL (ref 3.8–5.2)
SODIUM SERPL-SCNC: 129 MMOL/L (ref 135–145)
SYSTOLIC BLOOD PRESSURE - MUSE: NORMAL MMHG
T AXIS - MUSE: 71 DEGREES
VENTRICULAR RATE- MUSE: 92 BPM
WBC # BLD AUTO: 9 10E3/UL (ref 4–11)

## 2024-08-08 PROCEDURE — 250N000011 HC RX IP 250 OP 636: Mod: JZ | Performed by: STUDENT IN AN ORGANIZED HEALTH CARE EDUCATION/TRAINING PROGRAM

## 2024-08-08 PROCEDURE — 97530 THERAPEUTIC ACTIVITIES: CPT | Mod: GP | Performed by: PHYSICAL THERAPIST

## 2024-08-08 PROCEDURE — 85027 COMPLETE CBC AUTOMATED: CPT | Performed by: STUDENT IN AN ORGANIZED HEALTH CARE EDUCATION/TRAINING PROGRAM

## 2024-08-08 PROCEDURE — 36415 COLL VENOUS BLD VENIPUNCTURE: CPT | Performed by: STUDENT IN AN ORGANIZED HEALTH CARE EDUCATION/TRAINING PROGRAM

## 2024-08-08 PROCEDURE — 258N000003 HC RX IP 258 OP 636: Performed by: HOSPITALIST

## 2024-08-08 PROCEDURE — 120N000001 HC R&B MED SURG/OB

## 2024-08-08 PROCEDURE — 99232 SBSQ HOSP IP/OBS MODERATE 35: CPT | Performed by: HOSPITALIST

## 2024-08-08 PROCEDURE — 97161 PT EVAL LOW COMPLEX 20 MIN: CPT | Mod: GP | Performed by: PHYSICAL THERAPIST

## 2024-08-08 PROCEDURE — 250N000013 HC RX MED GY IP 250 OP 250 PS 637: Performed by: STUDENT IN AN ORGANIZED HEALTH CARE EDUCATION/TRAINING PROGRAM

## 2024-08-08 PROCEDURE — 80048 BASIC METABOLIC PNL TOTAL CA: CPT | Performed by: STUDENT IN AN ORGANIZED HEALTH CARE EDUCATION/TRAINING PROGRAM

## 2024-08-08 RX ORDER — SODIUM CHLORIDE, SODIUM LACTATE, POTASSIUM CHLORIDE, CALCIUM CHLORIDE 600; 310; 30; 20 MG/100ML; MG/100ML; MG/100ML; MG/100ML
INJECTION, SOLUTION INTRAVENOUS CONTINUOUS
Status: ACTIVE | OUTPATIENT
Start: 2024-08-08 | End: 2024-08-09

## 2024-08-08 RX ORDER — POLYETHYLENE GLYCOL 3350 17 G/17G
17 POWDER, FOR SOLUTION ORAL DAILY
DISCHARGE
Start: 2024-08-08

## 2024-08-08 RX ORDER — AMOXICILLIN 250 MG
1 CAPSULE ORAL 2 TIMES DAILY PRN
DISCHARGE
Start: 2024-08-08

## 2024-08-08 RX ORDER — ASPIRIN 81 MG/1
81 TABLET ORAL 2 TIMES DAILY
DISCHARGE
Start: 2024-08-08 | End: 2024-09-19

## 2024-08-08 RX ORDER — ACETAMINOPHEN 325 MG/1
650 TABLET ORAL EVERY 6 HOURS PRN
DISCHARGE
Start: 2024-08-10

## 2024-08-08 RX ADMIN — SODIUM CHLORIDE, POTASSIUM CHLORIDE, SODIUM LACTATE AND CALCIUM CHLORIDE: 600; 310; 30; 20 INJECTION, SOLUTION INTRAVENOUS at 17:34

## 2024-08-08 RX ADMIN — GABAPENTIN 200 MG: 100 CAPSULE ORAL at 15:59

## 2024-08-08 RX ADMIN — SENNOSIDES AND DOCUSATE SODIUM 1 TABLET: 50; 8.6 TABLET ORAL at 09:41

## 2024-08-08 RX ADMIN — CEFAZOLIN SODIUM 2 G: 2 INJECTION, SOLUTION INTRAVENOUS at 06:56

## 2024-08-08 RX ADMIN — SENNOSIDES AND DOCUSATE SODIUM 1 TABLET: 50; 8.6 TABLET ORAL at 20:07

## 2024-08-08 RX ADMIN — AMLODIPINE BESYLATE 10 MG: 10 TABLET ORAL at 09:41

## 2024-08-08 RX ADMIN — ACETAMINOPHEN 650 MG: 325 TABLET, FILM COATED ORAL at 08:38

## 2024-08-08 RX ADMIN — GABAPENTIN 200 MG: 100 CAPSULE ORAL at 20:07

## 2024-08-08 RX ADMIN — GABAPENTIN 200 MG: 100 CAPSULE ORAL at 09:40

## 2024-08-08 RX ADMIN — Medication 1 MG: at 21:28

## 2024-08-08 RX ADMIN — LEVOTHYROXINE SODIUM 100 MCG: 100 TABLET ORAL at 08:39

## 2024-08-08 RX ADMIN — ASPIRIN 81 MG: 81 TABLET, COATED ORAL at 09:41

## 2024-08-08 RX ADMIN — ACETAMINOPHEN 650 MG: 325 TABLET, FILM COATED ORAL at 21:28

## 2024-08-08 RX ADMIN — ASPIRIN 81 MG: 81 TABLET, COATED ORAL at 20:07

## 2024-08-08 RX ADMIN — ACETAMINOPHEN 650 MG: 325 TABLET, FILM COATED ORAL at 15:58

## 2024-08-08 ASSESSMENT — ACTIVITIES OF DAILY LIVING (ADL)
ADLS_ACUITY_SCORE: 36
ADLS_ACUITY_SCORE: 35
ADLS_ACUITY_SCORE: 36
ADLS_ACUITY_SCORE: 37
ADLS_ACUITY_SCORE: 37
ADLS_ACUITY_SCORE: 33
ADLS_ACUITY_SCORE: 33
ADLS_ACUITY_SCORE: 36
ADLS_ACUITY_SCORE: 33
ADLS_ACUITY_SCORE: 37
ADLS_ACUITY_SCORE: 36
ADLS_ACUITY_SCORE: 33
ADLS_ACUITY_SCORE: 33
ADLS_ACUITY_SCORE: 36
ADLS_ACUITY_SCORE: 37
ADLS_ACUITY_SCORE: 37
ADLS_ACUITY_SCORE: 33
ADLS_ACUITY_SCORE: 37
ADLS_ACUITY_SCORE: 37
ADLS_ACUITY_SCORE: 33
ADLS_ACUITY_SCORE: 33

## 2024-08-08 NOTE — PLAN OF CARE
Goal Outcome Evaluation:  Trauma/Ortho/Medical (Choose one)  trauma  Diagnosis: S/P fall - left hip hemiarthroplasty, acute left superior and inferior pubic rami fractures - nonsurgical  POD#: 1  Mental Status: A/Ox3-4 - forgetful at times  Activity/dangle 1-2 assist/GB/walker  Pain: denies - scheduled Tylenol given  Ortiz/Voiding: strait cathed for 400cc - DTV  02/LDA: RA/IVF's infusing  D/C Date: pending

## 2024-08-08 NOTE — PROVIDER NOTIFICATION
MD Notification    Notified Person: MD    Notified Person Name: Dr. Mac    Notification Date/Time: 8/8 0515    Notification Interaction: vocera    Purpose of Notification:  FYI Pt has had no urine output since being straight cathed in PACU @1800 for 1000mL. Pt is receiving LR @ 50mL/hr. Bladder scan @ 0515 for 232.     Orders Received: continue to monitor    Comments:

## 2024-08-08 NOTE — PROGRESS NOTES
"Orthopedic Surgery  Glo Gallagher  08/08/2024     Admit Date:  8/6/2024    POD: 1 Day Post-Op   Procedure(s):  LEFT HIP HEMIARTHROPLASTY  Acute left superior and inferior pubic rami fractures    Baseline dementia.  Patient resting comfortably in bed.    Patient reports that she slept well overnight and her left hip pain is \"so far so good.\" Denies left hip pain at rest.  Denies spasms to the LLE.  Notes transient tingling throughout the LLE.  Tolerating oral intake.    Denies nausea or vomiting.  Denies chest pain or shortness of breath.  No acute events overnight.    Temp:  [97.2  F (36.2  C)-98.5  F (36.9  C)] 98.5  F (36.9  C)  Pulse:  [] 92  Resp:  [13-30] 16  BP: ()/(52-94) 132/67  SpO2:  [93 %-100 %] 95 %    Alert and oriented to self. NAD. Non-toxic appearing. Follows commands.  Left hip Aquacel dressing is clean, dry, and intact.   Minimal erythema and ecchymosis of the surrounding skin.   Mild swelling diffusely to the left thigh, but compartments remain soft and compressible.  Bilateral calves are soft, non-tender.  Left lower extremity is NVI.  Patient able to resist ankle dorsiflexion and plantar flexion bilaterally.  Able to flex and extend toes.  DP pulse palpable.  Sensation intact bilateral lower extremities.    Labs/Imaging:  Recent Labs   Lab Test 08/08/24  0714 08/07/24  0801 08/06/24  1647   WBC 9.0 11.2* 11.8*   HGB 11.6* 13.4 14.5   * 171 182     Recent Labs   Lab Test 08/07/24  0801   INR 1.24*     Pelvis and left hip radiographs dated 8/7/24:  IMPRESSION:  1. Comminuted left superior and inferior pubic ramus fractures, significantly more displaced than on the preoperative study. Findings relayed to the patient's nurse, Mercedes Enriquez, by Angelica from the QC Department by phone on 8/7/2024 at 1946 hours.  2. Interval bipolar left hip hemiarthroplasty.  3. No other change.    A/P    1. S/p left hip hemiarthroplasty for management of a femoral neck fracture (DOS: 8/7/24); " left superior and inferior pubic rami fractures (nonop)  -Continue ASA 81 mg BID x 6 weeks for DVT prophylaxis. Monitor Hgb.  -Perioperative Ancef completed.  -Mobilize with PT/OT.  -WBAT LLE with walker.  -No left hip active abduction in the setting of possible nondisplaced greater trochanter fracture noted on postop imaging.    -Continue current pain regimen.  -Dressings: Keep intact. Okay for RN to change if >60% saturated or peeling/falling off.   -Follow-up: 2 weeks post-op with Dr. Juan Ramirez    2. Disposition  -Anticipate d/c to TCU when medically cleared and progressing in PT.    Christina Shafer PA-C  [facility]

## 2024-08-08 NOTE — PLAN OF CARE
Goal Outcome Evaluation:  Trauma/Ortho/Medical (Choose one)  trauma  Diagnosis: S/P fall - left hip hemiarthroplasty, acute left superior and inferior pubic rami fractures - nonsurgical  POD#: DOS  Mental Status: A/Ox3-4 - forgetful at times  Activity/dangle bedrest  Pain: denies  Ortiz/Voiding: strait cathed in PACU - DTV  02/LDA: 3L O2 via NC/IVF's infusing  D/C Date: pending  Other Info: Patient returned to unit from surgery at 1915. Patient settled into room and handed report off on oncoming shift RN.

## 2024-08-08 NOTE — PROGRESS NOTES
08/08/24 0930   Appointment Info   Signing Clinician's Name / Credentials (PT) Erinn Alatorre, JOSY   Living Environment   People in Home spouse   Current Living Arrangements house   Home Accessibility stairs to enter home   Number of Stairs, Main Entrance 3   Living Environment Comments Patient reporting that she lives in one level home with up to 3 stairs to enter. Lives with  who uses a SEC.   Self-Care   Usual Activity Tolerance good   Current Activity Tolerance fair   Equipment Currently Used at Home none   Fall history within last six months yes   Number of times patient has fallen within last six months 1   Activity/Exercise/Self-Care Comment Per patient, she does not use an AD. Per chart, she uses a walker. Was I prior to admit per pt. Will need clarification from family.   General Information   Onset of Illness/Injury or Date of Surgery 08/06/24   Referring Physician Juan Ramirez MD   Patient/Family Therapy Goals Statement (PT) None stated by pt. Appears understanding that she may need TCU.   Pertinent History of Current Problem (include personal factors and/or comorbidities that impact the POC) lGo Gallagher is a 89 year old female with a PMH of osteoporosis, hypothyroidism, neuropathy, prediabetes, cognitive decline, who was admitted on 8/6/2024 following a mechanical fall out of bed, with left hip and pelvic fracture. LEFT HIP HEMIARTHROPLASTY  Acute left superior and inferior pubic rami fractures   Existing Precautions/Restrictions fall   Weight-Bearing Status - LLE weight-bearing as tolerated   General Observations No hip precautions per orders.   Cognition   Affect/Mental Status (Cognition) WFL   Orientation Status (Cognition) oriented to;person;place;situation   Follows Commands (Cognition) WFL   Cognitive Status Comments per chart has dementia/cognitive decline. This was not noted during evaluation; family will have to clarify home setting and living situation questions.   Pain  Assessment   Patient Currently in Pain Yes, see Vital Sign flowsheet  (not rated; left hip w/ activity)   Integumentary/Edema   Integumentary/Edema Comments surgical sites on L hip region   Posture    Posture Forward head position   Range of Motion (ROM)   Range of Motion ROM deficits secondary to surgical procedure;ROM deficits secondary to pain   Strength (Manual Muscle Testing)   Strength (Manual Muscle Testing) Deficits observed during functional mobility   Strength Comments mild general weakness noted throughout   Bed Mobility   Comment, (Bed Mobility) Supine to sit with Mod plus Min   Transfers   Comment, (Transfers) STS with Mod plus Min   Gait/Stairs (Locomotion)   Comment, (Gait/Stairs) Two steps for eval to partial pivot to recliner with WW and Mod plus CGA   Balance   Balance Comments Requiring up to Mod for pivot transfer in WW; unsteadiness noted   Sensory Examination   Sensory Perception patient reports no sensory changes   Coordination   Coordination no deficits were identified   Muscle Tone   Muscle Tone no deficits were identified   Clinical Impression   Criteria for Skilled Therapeutic Intervention Yes, treatment indicated   PT Diagnosis (PT) Impaired functional mobility   Influenced by the following impairments pain, generalized deconditioning and weakness   Functional limitations due to impairments decreased I and endurance in functional mobility   Clinical Presentation (PT Evaluation Complexity) stable   Clinical Presentation Rationale clinical judgement   Clinical Decision Making (Complexity) low complexity   Planned Therapy Interventions (PT) balance training;bed mobility training;gait training;patient/family education;ROM (range of motion);strengthening;transfer training;progressive activity/exercise   Risk & Benefits of therapy have been explained evaluation/treatment results reviewed;care plan/treatment goals reviewed;risks/benefits reviewed;current/potential barriers reviewed;participants  voiced agreement with care plan;participants included;patient   PT Total Evaluation Time   PT Eval, Low Complexity Minutes (81927) 10   Physical Therapy Goals   PT Frequency 6x/week   PT Predicted Duration/Target Date for Goal Attainment 08/14/24   PT: Bed Mobility Supervision/stand-by assist;Supine to/from sit   PT: Transfers Supervision/stand-by assist;Sit to/from stand;Bed to/from chair;Assistive device   PT: Gait Supervision/stand-by assist;Rolling walker;150 feet   Therapeutic Activity   Therapeutic Activities: dynamic activities to improve functional performance Minutes (89297) 25   Symptoms Noted During/After Treatment Fatigue;Increased pain   Treatment Detail/Skilled Intervention Patient supine in bed. Educated on ankle pumps and mild L/E movement. Aide arrived and moved into functional mobility. Supine to sit with cueing and extra time; Mod plus Min. Scooted forward with sheet; rested in sit. STS into WW with Mod plus Min; returned to sit for walker to be adjusted; stood with same level of support. Turned and pivoted with small steps using WW and up to Mod A at times; recliner brought up behind pt. Sat with Min. Positioned comfortably with pillows after Min A to scoot back in bed. Chair alarm on.   PT Discharge Planning   PT Plan transfers, gait with WW, no prec., wc follow   PT Discharge Recommendation (DC Rec) Transitional Care Facility   PT Rationale for DC Rec Patient lives with her elderly  and is currently requiring up to Mod A plus Min A and only taking a few small pivot steps with WW. Recommend TCU to increase strength and functional mobility   PT Brief overview of current status Mod plus Min to Modof 2; stand pivot in WW; too painful/weak to ambulate further today. Nsg. may need to use SS.   PT Equipment Needed at Discharge   (unknown)   Total Session Time   Timed Code Treatment Minutes 25   Total Session Time (sum of timed and untimed services) 35

## 2024-08-08 NOTE — PROVIDER NOTIFICATION
MD Notification    Notified Person: MD    Notified Person Name: Dr. Hwang    Notification Interaction: vocera    Purpose of Notification: Patient hasn't voided since being strait cathed in PACU yesterday. IVF's infusing. Will strait cath patient for bladder scan of 500cc.    Orders Received: no

## 2024-08-08 NOTE — PLAN OF CARE
Diagnosis: Left hip hemiarthroplasty  POD#: 1  Mental Status: A&Ox4, forgetful  Activity/dangle up 2 GB walker. Stood at edge of bed  Diet: regular  Pain: denies  Ortiz/Voiding: CECILIA ayala. Bladder scan @0500 for 232mL  02/LDA:Ra. Iv infusing  D/C Date: pending  Other Info:  Dressing CDI. Cms intact. Redness blanchable sacrum/coccyx. K+ protocol

## 2024-08-08 NOTE — PROGRESS NOTES
MD Notification    Notified Person: MD    Notified Person Name: Dr. Blue    Notification Date/Time: 8/7 1950    Notification Interaction: vocbo     Purpose of Notification:  FYI [facility] called and informed x-ray of her Left hip results are in.     Orders Received:    Comments:

## 2024-08-08 NOTE — ANESTHESIA POSTPROCEDURE EVALUATION
Patient: Glo Gallagher    Procedure: Procedure(s):  LEFT HIP HEMIARTHROPLASTY       Anesthesia Type:  Spinal    Note:  Disposition: Inpatient   Postop Pain Control: Uneventful            Sign Out: Well controlled pain   PONV: No   Neuro/Psych: Uneventful            Sign Out: Acceptable/Baseline neuro status   Airway/Respiratory: Uneventful            Sign Out: Acceptable/Baseline resp. status   CV/Hemodynamics: Uneventful            Sign Out: Acceptable CV status; No obvious hypovolemia; No obvious fluid overload   Other NRE: NONE   DID A NON-ROUTINE EVENT OCCUR? No           Last vitals:  Vitals Value Taken Time   /77 08/07/24 1845   Temp 36.2  C (97.2  F) 08/07/24 1800   Pulse 92 08/07/24 1858   Resp 33 08/07/24 1858   SpO2 100 % 08/07/24 1858   Vitals shown include unfiled device data.    Electronically Signed By: Talha Acharya MD  August 7, 2024  8:23 PM

## 2024-08-08 NOTE — PROGRESS NOTES
[facility]  Hospitalist Progress Note        Rick Hwang MD   08/08/2024        Interval History:        - s/p left hip hemiarthroplasty 8/7/24; ortho following and plan for aspirin 81 mg BID  X 6 weeks for DVT prophylaxis; Follow-up: 2 weeks post-op with Dr. Juan Ramirez   - working with PT; SW to follow for disposition  - low urine output since surgery; was straight cathed in PACU for 1000 ml; will continue NS at 50 ML per hour X 24 hrs  - Na 127---129; Hb 13.4--->post op--->11.6; follow Hb         Assessment and Plan:        Glo Gallagher is a 89 year old female with a PMH of osteoporosis, hypothyroidism, neuropathy, prediabetes, cognitive decline, who was admitted on 8/6/2024 following a mechanical fall out of bed, with left hip and pelvic fracture.    She usually ambulates with the help of and apparently walker was not locked when she tried to grab it and had a fall.      Mechanical fall  Left femoral neck fracture with impaction, trauma related, osteoporosis related  Left superior/inferior pubic rami fractures  S/p left hip ruby-arthroplasty 8/7/24  Osteoporosis  - x-ray left femur (8/6) noted acute mildly displaced subcapital fracture of the left femoral neck  - x-ray pelvis (8/6) noted acute nondisplaced fractures of the left superior and  inferior pubic rami    - s/p left hip hemiarthroplasty 8/7/24; ortho following and plan for aspirin 81 mg BID  X 6 weeks for DVT prophylaxis; Follow-up: 2 weeks post-op with Dr. Juan Ramirez   -noted slight drop in hemoglobin postop 13.4---> 11.6; monitor hemoglobin  - working with PT; SW to follow for disposition  - pain control with cold packs, PRN pain meds  - PTA bisphosphonate held, resume per orthopedics    Postop low urine output, retention  - low urine output since surgery; was straight cathed in PACU for 1000 ml; will continue NS at 50 ML per hour X 24 hrs    Hyponatremia  Hypokalemia  - Na 127--- 126---129; will continue NS at  "50 ML per hour X 24 hrs  - K 3.3, normal magnesium 1.9; supplementing per K replacement protocol ; monitor BMP     Hypothyroidism  - TSH slightly elevated at 5.39; free T4-- normal at 1.26  - repeat TFTs in 4 to 6 weeks  - continue PTA Synthroid 100 mcg daily    Hypertension  - continue PTA amlodipine 10 mg daily  - Hydralazine IV prn SBP consistently >160    History of cognitive impairment  -No PTA meds, intolerant past Aricept  -At risk of delirium  -As needed melatonin at bedtime  -Assure good sleep-wake cycles     History of prediabetes, peripheral neuropathy  -Last HgbA1c 6.1% 10/2023, no PTA meds.    -PCP follow-up  -PTA gabapentin 200 3 times daily    Diet: Advance Diet as Tolerated: Regular Diet Adult      DVT Prophylaxis: PCD boots; Ortho plan for aspirin 81 mg BID  X 6 weeks for DVT prophylaxis    Code status: DNR/DNI    Disposition:   Medically Ready for Discharge: Anticipated in 2-4 Days pending PT rehab, orthopedic clearance  - SW consulted for disposition planning       Clinically Significant Risk Factors Present on Admission        # Hypokalemia: Lowest K = 3.3 mmol/L in last 2 days, will replace as needed  # Hyponatremia: Lowest Na = 126 mmol/L in last 2 days, will monitor as appropriate         # Coagulation Defect: INR = 1.24 (Ref range: 0.85 - 1.15) and/or PTT = N/A, will monitor for bleeding      # Hypertension: Noted on problem list                                Page Me (7 am to 6 pm)              Physical Exam:      Blood pressure 132/67, pulse 92, temperature 98.5  F (36.9  C), temperature source Oral, resp. rate 16, height 1.626 m (5' 4\"), weight 57.6 kg (127 lb), SpO2 95%.  Vitals:    08/06/24 1036   Weight: 57.6 kg (127 lb)     Vital Signs with Ranges  Temp:  [97.2  F (36.2  C)-98.5  F (36.9  C)] 98.5  F (36.9  C)  Pulse:  [] 92  Resp:  [13-30] 16  BP: ()/(52-94) 132/67  SpO2:  [93 %-100 %] 95 %  I/O's Last 24 hours  I/O last 3 completed shifts:  In: 1356 [P.O.:100; " I.V.:1256]  Out: 1175 [Urine:1075; Blood:100]    Constitutional: Alert, awake and reasonably oriented; resting comfortably in no apparent distress       Oral cavity: Moist mucosa   Cardiovascular: Normal s1 s2, regular rate and rhythm, no murmur   Lungs: B/l clear to auscultation, no wheezes or crepitations   Abdomen: Soft, nt, nd, no guarding, rigidity or rebound; BS +   LE : No edema   Musculoskeletal/Neuro Power 5/5 in all extremities; No focal neurological deficits noted   Psychiatry: normal mood and affect  left hip dressing in place                Medications:        Current Facility-Administered Medications   Medication Dose Route Frequency Provider Last Rate Last Admin    acetaminophen (TYLENOL) tablet 650 mg  650 mg Oral Q6H WA Juan Ramirez MD   650 mg at 08/07/24 2011    Or    acetaminophen (TYLENOL) Suppository 650 mg  650 mg Rectal Q6H Juan Live MD        amLODIPine (NORVASC) tablet 10 mg  10 mg Oral Daily Juan Ramirez MD   10 mg at 08/07/24 0830    aspirin EC tablet 81 mg  81 mg Oral BID Juan Ramirez MD   81 mg at 08/07/24 2011    gabapentin (NEURONTIN) capsule 200 mg  200 mg Oral TID Juan Ramirez MD   200 mg at 08/07/24 2010    levothyroxine (SYNTHROID/LEVOTHROID) tablet 100 mcg  100 mcg Oral Daily Juan Ramirez MD   100 mcg at 08/07/24 0830    polyethylene glycol (MIRALAX) Packet 17 g  17 g Oral Daily Juan Ramirez MD        senna-docusate (SENOKOT-S/PERICOLACE) 8.6-50 MG per tablet 1 tablet  1 tablet Oral BID Juan Ramirez MD   1 tablet at 08/07/24 2011    sodium chloride (PF) 0.9% PF flush 3 mL  3 mL Intracatheter Q8H Juan Ramirez MD         PRN Meds:   Current Facility-Administered Medications   Medication Dose Route Frequency Provider Last Rate Last Admin    [START ON 8/10/2024] acetaminophen (TYLENOL) tablet 650 mg  650 mg Oral Q4H PRN Juan Ramirez MD        benzocaine-menthol (CHLORASEPTIC) 6-10 MG lozenge 1 lozenge  1 lozenge Buccal  Q1H PRN Juan Ramirez MD        [START ON 8/10/2024] bisacodyl (DULCOLAX) suppository 10 mg  10 mg Rectal Daily PRN Juan Ramirez MD        calcium carbonate (TUMS) chewable tablet 1,000 mg  1,000 mg Oral 4x Daily PRN Juan Ramirez MD        HOLD: ALL Anticoagulant medications until AFTER surgery   Does not apply HOLD Juan Ramirez MD        hydrALAZINE (APRESOLINE) injection 10 mg  10 mg Intravenous Q6H PRN Juan Ramirez MD        hydrALAZINE (APRESOLINE) tablet 10 mg  10 mg Oral Q4H PRN Juan Ramirez MD        HYDROmorphone (DILAUDID) half-tab 1 mg  1 mg Oral Q4H PRN Juan Ramirez MD        Or    HYDROmorphone (DILAUDID) tablet 2 mg  2 mg Oral Q4H PRN Juan Ramirez MD        HYDROmorphone (DILAUDID) injection 0.2 mg  0.2 mg Intravenous Q2H PRN Juan Ramirez MD        Or    HYDROmorphone (DILAUDID) injection 0.4 mg  0.4 mg Intravenous Q2H PRN Juan Ramirez MD        ibuprofen (ADVIL/MOTRIN) tablet 600 mg  600 mg Oral Q6H PRN Juan Ramirez MD        lidocaine (LMX4) cream   Topical Q1H PRN Juan Ramirez MD        lidocaine 1 % 0.1-1 mL  0.1-1 mL Other Q1H PRN Juan Ramirez MD        [START ON 8/9/2024] magnesium hydroxide (MILK OF MAGNESIA) suspension 30 mL  30 mL Oral Daily PRN Juan Ramirez MD        melatonin tablet 1 mg  1 mg Oral At Bedtime PRN Juan Ramirez MD        methocarbamol (ROBAXIN) half-tab 250 mg  250 mg Oral TID PRN Juan Ramirez MD        naloxone (NARCAN) injection 0.2 mg  0.2 mg Intravenous Q2 Min PRN Juan Ramirez MD        Or    naloxone (NARCAN) injection 0.4 mg  0.4 mg Intravenous Q2 Min PRN Juan Ramirez MD        Or    naloxone (NARCAN) injection 0.2 mg  0.2 mg Intramuscular Q2 Min PRN Juan Ramirez MD        Or    naloxone (NARCAN) injection 0.4 mg  0.4 mg Intramuscular Q2 Min PRN Juan Ramirez MD        ondansetron (ZOFRAN ODT) ODT tab 4 mg  4 mg Oral Q6H PRN Juan Ramirez MD        Or  "   ondansetron (ZOFRAN) injection 4 mg  4 mg Intravenous Q6H PRN Juan Ramirez MD        oxyCODONE (ROXICODONE) tablet 5 mg  5 mg Oral Q4H PRN Juan Ramirez MD        Or    oxyCODONE (ROXICODONE) tablet 10 mg  10 mg Oral Q4H PRN Juan Ramirez MD        prochlorperazine (COMPAZINE) injection 5 mg  5 mg Intravenous Q6H PRN Juan Ramirez MD        Or    prochlorperazine (COMPAZINE) tablet 5 mg  5 mg Oral Q6H PRN Juan Ramirez MD        Or    prochlorperazine (COMPAZINE) suppository 12.5 mg  12.5 mg Rectal Q12H PRN Juan Ramirez MD        senna-docusate (SENOKOT-S/PERICOLACE) 8.6-50 MG per tablet 1 tablet  1 tablet Oral BID PRN Juan Ramirez MD        Or    senna-docusate (SENOKOT-S/PERICOLACE) 8.6-50 MG per tablet 2 tablet  2 tablet Oral BID PRN Juan Ramirez MD        sodium chloride (PF) 0.9% PF flush 3 mL  3 mL Intracatheter q1 min prn Juan Ramirez MD                Data:      All new lab and imaging data was reviewed.   Recent Labs   Lab Test 08/08/24  0714 08/07/24  0801 08/06/24  1647   WBC 9.0 11.2* 11.8*   HGB 11.6* 13.4 14.5   MCV 88 87 87   * 171 182   INR  --  1.24*  --       Recent Labs   Lab Test 08/08/24  0714 08/07/24  2125 08/07/24  1521 08/07/24  0801 08/06/24  1647   *  --  129* 126* 127*   POTASSIUM 3.7  --  3.8 3.3* 3.8   CHLORIDE 99  --   --  92* 92*   CO2 21*  --   --  23 23   BUN 11.3  --   --  12.0 15.9   CR 0.44*  --  0.42* 0.45* 0.59   ANIONGAP 9  --   --  11 12   NGOC 7.9*  --   --  8.6* 9.0   * 155*  --  120* 142*     No lab results found.    Invalid input(s): \"TROP\", \"TROPONINIES\"      "

## 2024-08-09 ENCOUNTER — APPOINTMENT (OUTPATIENT)
Dept: PHYSICAL THERAPY | Facility: CLINIC | Age: 89
DRG: 522 | End: 2024-08-09
Payer: COMMERCIAL

## 2024-08-09 LAB
ANION GAP SERPL CALCULATED.3IONS-SCNC: 6 MMOL/L (ref 7–15)
BUN SERPL-MCNC: 8.2 MG/DL (ref 8–23)
CALCIUM SERPL-MCNC: 7.8 MG/DL (ref 8.8–10.4)
CHLORIDE SERPL-SCNC: 99 MMOL/L (ref 98–107)
CREAT SERPL-MCNC: 0.46 MG/DL (ref 0.51–0.95)
EGFRCR SERPLBLD CKD-EPI 2021: 90 ML/MIN/1.73M2
GLUCOSE SERPL-MCNC: 119 MG/DL (ref 70–99)
HCO3 SERPL-SCNC: 26 MMOL/L (ref 22–29)
HGB BLD-MCNC: 10.6 G/DL (ref 11.7–15.7)
POTASSIUM SERPL-SCNC: 3.7 MMOL/L (ref 3.4–5.3)
SODIUM SERPL-SCNC: 131 MMOL/L (ref 135–145)

## 2024-08-09 PROCEDURE — 250N000013 HC RX MED GY IP 250 OP 250 PS 637: Performed by: STUDENT IN AN ORGANIZED HEALTH CARE EDUCATION/TRAINING PROGRAM

## 2024-08-09 PROCEDURE — 36415 COLL VENOUS BLD VENIPUNCTURE: CPT | Performed by: STUDENT IN AN ORGANIZED HEALTH CARE EDUCATION/TRAINING PROGRAM

## 2024-08-09 PROCEDURE — 120N000001 HC R&B MED SURG/OB

## 2024-08-09 PROCEDURE — 97116 GAIT TRAINING THERAPY: CPT | Mod: GP | Performed by: PHYSICAL THERAPIST

## 2024-08-09 PROCEDURE — 97530 THERAPEUTIC ACTIVITIES: CPT | Mod: GP | Performed by: PHYSICAL THERAPIST

## 2024-08-09 PROCEDURE — 80048 BASIC METABOLIC PNL TOTAL CA: CPT | Performed by: STUDENT IN AN ORGANIZED HEALTH CARE EDUCATION/TRAINING PROGRAM

## 2024-08-09 PROCEDURE — 85018 HEMOGLOBIN: CPT | Performed by: STUDENT IN AN ORGANIZED HEALTH CARE EDUCATION/TRAINING PROGRAM

## 2024-08-09 PROCEDURE — 99232 SBSQ HOSP IP/OBS MODERATE 35: CPT | Performed by: HOSPITALIST

## 2024-08-09 RX ADMIN — POLYETHYLENE GLYCOL 3350 17 G: 17 POWDER, FOR SOLUTION ORAL at 09:39

## 2024-08-09 RX ADMIN — ACETAMINOPHEN 650 MG: 325 TABLET, FILM COATED ORAL at 21:46

## 2024-08-09 RX ADMIN — SENNOSIDES AND DOCUSATE SODIUM 1 TABLET: 50; 8.6 TABLET ORAL at 20:15

## 2024-08-09 RX ADMIN — ASPIRIN 81 MG: 81 TABLET, COATED ORAL at 20:15

## 2024-08-09 RX ADMIN — AMLODIPINE BESYLATE 10 MG: 10 TABLET ORAL at 09:39

## 2024-08-09 RX ADMIN — LEVOTHYROXINE SODIUM 100 MCG: 100 TABLET ORAL at 09:39

## 2024-08-09 RX ADMIN — GABAPENTIN 200 MG: 100 CAPSULE ORAL at 09:38

## 2024-08-09 RX ADMIN — ACETAMINOPHEN 650 MG: 325 TABLET, FILM COATED ORAL at 16:52

## 2024-08-09 RX ADMIN — GABAPENTIN 200 MG: 100 CAPSULE ORAL at 20:15

## 2024-08-09 RX ADMIN — ACETAMINOPHEN 650 MG: 325 TABLET, FILM COATED ORAL at 09:39

## 2024-08-09 RX ADMIN — SENNOSIDES AND DOCUSATE SODIUM 1 TABLET: 50; 8.6 TABLET ORAL at 09:39

## 2024-08-09 RX ADMIN — Medication 1 MG: at 23:37

## 2024-08-09 RX ADMIN — ASPIRIN 81 MG: 81 TABLET, COATED ORAL at 09:41

## 2024-08-09 ASSESSMENT — ACTIVITIES OF DAILY LIVING (ADL)
ADLS_ACUITY_SCORE: 37
ADLS_ACUITY_SCORE: 36
ADLS_ACUITY_SCORE: 37
ADLS_ACUITY_SCORE: 36
ADLS_ACUITY_SCORE: 37
ADLS_ACUITY_SCORE: 37
DEPENDENT_IADLS:: CLEANING;COOKING;LAUNDRY;MEAL PREPARATION;TRANSPORTATION
ADLS_ACUITY_SCORE: 37
ADLS_ACUITY_SCORE: 36
ADLS_ACUITY_SCORE: 36

## 2024-08-09 NOTE — PLAN OF CARE
Goal Outcome Evaluation:      Plan of Care Reviewed With: patient, spouse, child    Overall Patient Progress: improvingOverall Patient Progress: improving    Outcome Evaluation: Patient will discharge to TCU once medically ready and appropriate facility found

## 2024-08-09 NOTE — PLAN OF CARE
Diagnosis: Left hip hemiarthroplasty  POD#: 2  Mental Status: A&Ox4, forgetful  Activity/dangle up 2 GB walker, pivot to BSC  Diet: regular  Pain: denies  Ortiz/Voiding: CECILIA ayala straight cath @0450 for 500mL  02/LDA: RA. Iv infusing  D/C Date: pending  Other Info: K+ protocol. Dressing CDI. Cms intact. PRN melatonin given

## 2024-08-09 NOTE — PROGRESS NOTES
[facility]  Hospitalist Progress Note        Rick Hwang MD   08/09/2024        Interval History:        - working with PT, appears very deconditioned, PT rec TCU;  to follow for disposition  - needed straight cath for urinary retention; will plan to place sellers catheter if retaining again  - Na improved--->131  - HB 14.5---13.4---11.6---10.6; likely some dilutional component in addition to postop acute blood loss anemia  - will monitor daily Hb until stable; will discontinue IVF (8/9)         Assessment and Plan:        Glo Gallagher is a 89 year old female with a PMH of osteoporosis, hypothyroidism, neuropathy, prediabetes, cognitive decline, who was admitted on 8/6/2024 following a mechanical fall out of bed, with left hip and pelvic fracture.    She usually ambulates with the help of and apparently walker was not locked when she tried to grab it and had a fall.      Mechanical fall  Left femoral neck fracture with impaction, trauma related, osteoporosis related  Left superior/inferior pubic rami fractures  S/p left hip ruby-arthroplasty 8/7/24  Osteoporosis  - x-ray left femur (8/6) noted acute mildly displaced subcapital fracture of the left femoral neck  - x-ray pelvis (8/6) noted acute nondisplaced fractures of the left superior and  inferior pubic rami    - s/p left hip hemiarthroplasty 8/7/24; ortho following and plan for aspirin 81 mg BID  X 6 weeks for DVT prophylaxis; Follow-up: 2 weeks post-op with Dr. Juan Ramirez   - working with PT, appears very deconditioned, PT rec TCU;  to follow for disposition  - pain control with cold packs, PRN pain meds  - PTA bisphosphonate held, resume per orthopedics    Likely postop acute blood loss anemia  - HB 14.5---13.4---11.6---10.6; likely some dilutional component in addition to postop acute blood loss anemia  - will monitor daily Hb until stable; will discontinue IVF (8/9)    Postop low urine  "output  Urinary retention  - low urine output since surgery; was straight cathed in PACU for 1000 ml  - needed several straight cath for urinary retention; will plan to place sellers catheter if retaining again  - discontinued IVF (8/9)    Hyponatremia  Hypokalemia  - Na 127--- 126---129---131, improving with IV hydration; IVF d/jacey 8/9  - K 3.3, normal magnesium 1.9; supplementing per K replacement protocol   - monitor BMP intermittently     Hypothyroidism  - TSH slightly elevated at 5.39; free T4-- normal at 1.26  - repeat TFTs in 4 to 6 weeks  - continue PTA Synthroid 100 mcg daily    Hypertension  - continue PTA amlodipine 10 mg daily  - Hydralazine IV prn SBP consistently >160    History of cognitive impairment  -No PTA meds, intolerant past Aricept  -At risk of delirium  -As needed melatonin at bedtime  -Assure good sleep-wake cycles     History of prediabetes, peripheral neuropathy  - Last HgbA1c 6.1% 10/2023, no PTA meds.    - PCP follow-up  - PTA gabapentin 200mg 3 times daily    Diet: Advance Diet as Tolerated: Regular Diet Adult      DVT Prophylaxis: PCD boots; Ortho plan for aspirin 81 mg BID  X 6 weeks for DVT prophylaxis    Code status: DNR/DNI    Disposition:   Medically Ready for Discharge: Anticipated Tomorrow pending clinical stability , TCU placement , orthopedic clearance  - SW consulted for disposition planning       Clinically Significant Risk Factors Present on Admission        # Hypokalemia: Lowest K = 3.3 mmol/L in last 2 days, will replace as needed  # Hyponatremia: Lowest Na = 126 mmol/L in last 2 days, will monitor as appropriate         # Coagulation Defect: INR = 1.24 (Ref range: 0.85 - 1.15) and/or PTT = N/A, will monitor for bleeding      # Hypertension: Noted on problem list                                Page Me (7 am to 6 pm)              Physical Exam:      Blood pressure 117/65, pulse 85, temperature 97.8  F (36.6  C), temperature source Oral, resp. rate 16, height 1.626 m (5' 4\"), " weight 57.6 kg (127 lb), SpO2 90%.  Vitals:    08/06/24 1036   Weight: 57.6 kg (127 lb)     Vital Signs with Ranges  Temp:  [97.4  F (36.3  C)-97.8  F (36.6  C)] 97.8  F (36.6  C)  Pulse:  [85-94] 85  Resp:  [16-18] 16  BP: (111-117)/(65-71) 117/65  SpO2:  [90 %-93 %] 90 %  I/O's Last 24 hours  I/O last 3 completed shifts:  In: 300 [P.O.:300]  Out: 500 [Urine:500]    Constitutional: Alert, awake and reasonably oriented; resting comfortably in no apparent distress       Oral cavity: Moist mucosa   Cardiovascular: Normal s1 s2, regular rate and rhythm, no murmur   Lungs: B/l clear to auscultation, no wheezes or crepitations   Abdomen: Soft, nt, nd, no guarding, rigidity or rebound; BS +   LE : No edema   Musculoskeletal/Neuro Power 5/5 in all extremities; No focal neurological deficits noted   Psychiatry: normal mood and affect  left hip dressing in place                Medications:        Current Facility-Administered Medications   Medication Dose Route Frequency Provider Last Rate Last Admin    acetaminophen (TYLENOL) tablet 650 mg  650 mg Oral Q6H Juan Live MD   650 mg at 08/08/24 2128    Or    acetaminophen (TYLENOL) Suppository 650 mg  650 mg Rectal Q6H Juan Live MD        amLODIPine (NORVASC) tablet 10 mg  10 mg Oral Daily Juan Ramirez MD   10 mg at 08/08/24 0941    aspirin EC tablet 81 mg  81 mg Oral BID Juan Ramirez MD   81 mg at 08/08/24 2007    gabapentin (NEURONTIN) capsule 200 mg  200 mg Oral TID Juan Ramirez MD   200 mg at 08/08/24 2007    levothyroxine (SYNTHROID/LEVOTHROID) tablet 100 mcg  100 mcg Oral Daily Juan Ramirez MD   100 mcg at 08/08/24 0839    polyethylene glycol (MIRALAX) Packet 17 g  17 g Oral Daily Juan Ramirez MD        senna-docusate (SENOKOT-S/PERICOLACE) 8.6-50 MG per tablet 1 tablet  1 tablet Oral BID Juan Ramirez MD   1 tablet at 08/08/24 2007    sodium chloride (PF) 0.9% PF flush 3 mL  3 mL Intracatheter Q8H James  MD Juan         PRN Meds:   Current Facility-Administered Medications   Medication Dose Route Frequency Provider Last Rate Last Admin    [START ON 8/10/2024] acetaminophen (TYLENOL) tablet 650 mg  650 mg Oral Q4H PRN Juan Ramirez MD        benzocaine-menthol (CHLORASEPTIC) 6-10 MG lozenge 1 lozenge  1 lozenge Buccal Q1H PRN Juan Ramirez MD        [START ON 8/10/2024] bisacodyl (DULCOLAX) suppository 10 mg  10 mg Rectal Daily PRN Juan Ramirez MD        calcium carbonate (TUMS) chewable tablet 1,000 mg  1,000 mg Oral 4x Daily PRN Juan Ramirez MD        HOLD: ALL Anticoagulant medications until AFTER surgery   Does not apply HOLD Juan Ramirez MD        hydrALAZINE (APRESOLINE) injection 10 mg  10 mg Intravenous Q6H PRN Juan Ramirez MD        hydrALAZINE (APRESOLINE) tablet 10 mg  10 mg Oral Q4H PRN Juan Ramirez MD        HYDROmorphone (DILAUDID) half-tab 1 mg  1 mg Oral Q4H PRN Juan Ramirez MD        Or    HYDROmorphone (DILAUDID) tablet 2 mg  2 mg Oral Q4H PRN Juan Ramirez MD        HYDROmorphone (DILAUDID) injection 0.2 mg  0.2 mg Intravenous Q2H PRN Juan Ramirez MD        Or    HYDROmorphone (DILAUDID) injection 0.4 mg  0.4 mg Intravenous Q2H PRN Juan Ramriez MD        ibuprofen (ADVIL/MOTRIN) tablet 600 mg  600 mg Oral Q6H PRN Juan Ramirez MD        lidocaine (LMX4) cream   Topical Q1H PRN Juan Ramirez MD        lidocaine 1 % 0.1-1 mL  0.1-1 mL Other Q1H PRN Juan Ramirez MD        magnesium hydroxide (MILK OF MAGNESIA) suspension 30 mL  30 mL Oral Daily PRN Juan Ramirez MD        melatonin tablet 1 mg  1 mg Oral At Bedtime PRN Juan Ramirez MD   1 mg at 08/08/24 2128    methocarbamol (ROBAXIN) half-tab 250 mg  250 mg Oral TID PRN Juan Ramirez MD        naloxone (NARCAN) injection 0.2 mg  0.2 mg Intravenous Q2 Min PRN Juan Ramirez MD        Or    naloxone (NARCAN) injection 0.4 mg  0.4 mg Intravenous Q2 Min  PRN Jaun Ramirez MD        Or    naloxone (NARCAN) injection 0.2 mg  0.2 mg Intramuscular Q2 Min PRN Juan Ramirez MD        Or    naloxone (NARCAN) injection 0.4 mg  0.4 mg Intramuscular Q2 Min PRJuan Wilcox MD        ondansetron (ZOFRAN ODT) ODT tab 4 mg  4 mg Oral Q6H PRN Juan Ramirez MD        Or    ondansetron (ZOFRAN) injection 4 mg  4 mg Intravenous Q6H PRN Juan Ramirez MD        oxyCODONE (ROXICODONE) tablet 5 mg  5 mg Oral Q4H PRN Juan Ramirez MD        Or    oxyCODONE (ROXICODONE) tablet 10 mg  10 mg Oral Q4H PRN Juan Ramirez MD        prochlorperazine (COMPAZINE) injection 5 mg  5 mg Intravenous Q6H PRN Juan Ramirez MD        Or    prochlorperazine (COMPAZINE) tablet 5 mg  5 mg Oral Q6H PRN Juan Ramirez MD        Or    prochlorperazine (COMPAZINE) suppository 12.5 mg  12.5 mg Rectal Q12H PRN Juan Ramirez MD        senna-docusate (SENOKOT-S/PERICOLACE) 8.6-50 MG per tablet 1 tablet  1 tablet Oral BID PRN Juan Ramirez MD        Or    senna-docusate (SENOKOT-S/PERICOLACE) 8.6-50 MG per tablet 2 tablet  2 tablet Oral BID PRN Juan Ramirez MD        sodium chloride (PF) 0.9% PF flush 3 mL  3 mL Intracatheter q1 min prJuan Wilcox MD                Data:      All new lab and imaging data was reviewed.   Recent Labs   Lab Test 08/09/24  0735 08/08/24  0714 08/07/24  0801 08/06/24  1647   WBC  --  9.0 11.2* 11.8*   HGB 10.6* 11.6* 13.4 14.5   MCV  --  88 87 87   PLT  --  134* 171 182   INR  --   --  1.24*  --       Recent Labs   Lab Test 08/08/24  0714 08/07/24  2125 08/07/24  1521 08/07/24  0801 08/06/24  1647   *  --  129* 126* 127*   POTASSIUM 3.7  --  3.8 3.3* 3.8   CHLORIDE 99  --   --  92* 92*   CO2 21*  --   --  23 23   BUN 11.3  --   --  12.0 15.9   CR 0.44*  --  0.42* 0.45* 0.59   ANIONGAP 9  --   --  11 12   NGOC 7.9*  --   --  8.6* 9.0   * 155*  --  120* 142*     No lab results found.    Invalid input(s):  "\"TROP\", \"TROPONINIES\"      "

## 2024-08-09 NOTE — CONSULTS
Care Management Initial Consult    General Information  Assessment completed with: Patient, Spouse or significant other, Children, Patient, spouse Jose, son Brandt and his spouse  Type of CM/SW Visit: Offer D/C Planning    Primary Care Provider verified and updated as needed: Yes (Dr. Ramirez)   Readmission within the last 30 days: no previous admission in last 30 days      Reason for Consult: discharge planning  Advance Care Planning: Advance Care Planning Reviewed: no concerns identified          Communication Assessment  Patient's communication style: spoken language (English or Bilingual)    Hearing Difficulty or Deaf: yes   Wear Glasses or Blind: no    Cognitive  Cognitive/Neuro/Behavioral: .WDL except  Level of Consciousness: confused, alert  Arousal Level: opens eyes spontaneously  Orientation: disoriented to, time  Mood/Behavior: calm, cooperative  Best Language: 0 - No aphasia  Speech: clear    Living Environment:   People in home: spouse  Jose  Current living Arrangements: house      Able to return to prior arrangements: yes  Living Arrangement Comments: [facility] require TCU stay prior to returning home    Family/Social Support:  Care provided by: self  Provides care for: no one  Marital Status:   , Children          Description of Support System: Supportive, Involved         Current Resources:   Patient receiving home care services: No     Community Resources: None  Equipment currently used at home: none  Supplies currently used at home:      Employment/Financial:  Employment Status: retired        Financial Concerns: none           Does the patient's insurance plan have a 3 day qualifying hospital stay waiver?  Yes     Which insurance plan 3 day waiver is available? Alternative insurance waiver    Will the waiver be used for post-acute placement? Yes    Lifestyle & Psychosocial Needs:  Social Determinants of Health     Food Insecurity: Not on file   Depression: Not at risk (10/6/2023)    Received  "from [facility]    PHQ-2     PHQ-2 Score: 0   Housing Stability: Not on file   Tobacco Use: Low Risk  (8/7/2024)    Patient History     Smoking Tobacco Use: Never     Smokeless Tobacco Use: Never     Passive Exposure: Not on file   Financial Resource Strain: Not on file   Alcohol Use: Not on file   Transportation Needs: Not on file   Physical Activity: Not on file   Interpersonal Safety: Not on file   Stress: Not on file   Social Connections: Not on file   Health Literacy: Not on file       Functional Status:  Prior to admission patient needed assistance:   Dependent ADLs:: Independent  Dependent IADLs:: Cleaning, Cooking, Laundry, Meal Preparation, Transportation       Mental Health Status:          Chemical Dependency Status:                Values/Beliefs:  Spiritual, Cultural Beliefs, Advent Practices, Values that affect care: no               Additional Information:  Met with patient, spouse Jose, son Brandt and daughter in law at bedside; explained role in discharge planning.    Patient admitted with a fall at home and subsequent surgery for Left hip fracture.    She lives in a rambler house with her spouse.  There are 2 steps to enter the home.  The laundry is in the basement; she does not go downstairs.  Bed and bath on main level; she does not use any assistive device to ambulate; she uses a shower chair.  She is independent at baseline for ADL's and her  manages most of the IADL's including driving to appointments.    Patient is accepting of a TCU recommendation.  A list of TCU's was provided.  Also reviewed the TCU information in the admission packet as well as provided them with the \"Bridge from Hospital to Home\" resource.  Their preference was a TCU in [place] for ease of spouse to drive to.  First preference [facility] then [facility].  Referrals were sent.    Patient/family does not think she could get in the family car so would appreciate arranging wheelchair " transport.  Quoted approximate cost of $96 base plus $6 per mile and that was agreeable. They will call [facility] to check coverage.    Care management will continue to follow through discharge to transitional care.   updated.    Pearl Chowdhury RN  Inpatient Float Care Coordinator  [facility]

## 2024-08-09 NOTE — PROGRESS NOTES
Orthopedic Surgery  Glo Gallagher  08/09/2024     Admit Date:  8/6/2024    POD: 2 Days Post-Op   Procedure(s):  LEFT HIP HEMIARTHROPLASTY  Acute left superior and inferior pubic rami fractures    Baseline dementia.  Patient resting comfortably in bed. Sleepy this morning, but awakes to exam.  Denies pain to the left hip.  Denies spasms to the LLE.  Notes transient tingling throughout the LLE.  Tolerating oral intake.    Denies nausea or vomiting.  Denies chest pain or shortness of breath.  No acute events overnight.    Temp:  [97.4  F (36.3  C)-99.6  F (37.6  C)] 99.6  F (37.6  C)  Pulse:  [82-94] 82  Resp:  [16-18] 16  BP: (111-127)/(65-71) 127/66  SpO2:  [90 %-93 %] 93 %    Alert and oriented to self. NAD. Non-toxic appearing. Follows commands.  Patient's bilateral feet do point inwards at baseline.  Left hip Aquacel dressing is clean, dry, and intact.   Minimal erythema and ecchymosis of the surrounding skin.   Mild swelling diffusely to the left thigh, but compartments remain soft and compressible.  Bilateral calves are soft, non-tender.  Left lower extremity is NVI.  Patient able to resist ankle dorsiflexion and plantar flexion bilaterally.  Able to flex and extend toes.  DP pulse palpable.  Sensation intact bilateral lower extremities.    Labs/Imaging:  Recent Labs   Lab Test 08/09/24  0735 08/08/24  0714 08/07/24  0801 08/06/24  1647   WBC  --  9.0 11.2* 11.8*   HGB 10.6* 11.6* 13.4 14.5   PLT  --  134* 171 182     Recent Labs   Lab Test 08/07/24  0801   INR 1.24*     A/P    1. S/p left hip hemiarthroplasty for management of a femoral neck fracture (DOS: 8/7/24); left superior and inferior pubic rami fractures (nonop), possible left greater trochanter fracture (nonop)  -Continue ASA 81 mg BID x 6 weeks for DVT prophylaxis. Monitor Hgb.  -Perioperative Ancef completed.  -Mobilize with PT/OT.  -WBAT LLE with walker.  -No left hip active abduction in the setting of possible nondisplaced greater trochanter  fracture noted on postop imaging.    -Continue current pain regimen. Patient has not taken opioids on POD#1 or POD#2.  -Dressings: Keep intact. Okay for RN to change if >60% saturated or peeling/falling off.   -Follow-up: 2 weeks post-op with Dr. Juan Ramirez    2. Disposition  -Anticipate d/c to TCU when medically cleared and progressing in PT. Ortho stable.    Christina Shafer PA-C  [facility]

## 2024-08-09 NOTE — PLAN OF CARE
Goal Outcome Evaluation:  Date/Time 08/09/24  [id number]    Trauma/Ortho/Medical (Choose one) Ortho    Diagnosis: L ruby Arthroplasty   POD#:2  Mental Status: A&Ox4, forgetful  Activity/dangle up with 2 GBW  Diet: reg  Pain: Enid Tylenol  Ortiz/Voiding: INC and purewick   Tele/Restraints/Iso: NA  02/LDA: RA, IV SL  D/C Date: TBD  Other Info:

## 2024-08-10 LAB
HGB BLD-MCNC: 10.6 G/DL (ref 11.7–15.7)
POTASSIUM SERPL-SCNC: 3.7 MMOL/L (ref 3.4–5.3)

## 2024-08-10 PROCEDURE — 85018 HEMOGLOBIN: CPT | Performed by: HOSPITALIST

## 2024-08-10 PROCEDURE — 120N000001 HC R&B MED SURG/OB

## 2024-08-10 PROCEDURE — 84132 ASSAY OF SERUM POTASSIUM: CPT | Performed by: STUDENT IN AN ORGANIZED HEALTH CARE EDUCATION/TRAINING PROGRAM

## 2024-08-10 PROCEDURE — 36415 COLL VENOUS BLD VENIPUNCTURE: CPT | Performed by: STUDENT IN AN ORGANIZED HEALTH CARE EDUCATION/TRAINING PROGRAM

## 2024-08-10 PROCEDURE — 250N000013 HC RX MED GY IP 250 OP 250 PS 637: Performed by: HOSPITALIST

## 2024-08-10 PROCEDURE — 250N000013 HC RX MED GY IP 250 OP 250 PS 637: Performed by: STUDENT IN AN ORGANIZED HEALTH CARE EDUCATION/TRAINING PROGRAM

## 2024-08-10 PROCEDURE — 99232 SBSQ HOSP IP/OBS MODERATE 35: CPT | Performed by: HOSPITALIST

## 2024-08-10 RX ORDER — BISACODYL 10 MG
10 SUPPOSITORY, RECTAL RECTAL DAILY PRN
Status: DISCONTINUED | OUTPATIENT
Start: 2024-08-11 | End: 2024-08-11 | Stop reason: HOSPADM

## 2024-08-10 RX ORDER — BISACODYL 10 MG
10 SUPPOSITORY, RECTAL RECTAL ONCE
Status: COMPLETED | OUTPATIENT
Start: 2024-08-10 | End: 2024-08-10

## 2024-08-10 RX ADMIN — Medication 1 MG: at 21:48

## 2024-08-10 RX ADMIN — GABAPENTIN 200 MG: 100 CAPSULE ORAL at 14:14

## 2024-08-10 RX ADMIN — ACETAMINOPHEN 650 MG: 325 TABLET, FILM COATED ORAL at 14:14

## 2024-08-10 RX ADMIN — GABAPENTIN 200 MG: 100 CAPSULE ORAL at 08:51

## 2024-08-10 RX ADMIN — AMLODIPINE BESYLATE 10 MG: 10 TABLET ORAL at 08:52

## 2024-08-10 RX ADMIN — LEVOTHYROXINE SODIUM 100 MCG: 100 TABLET ORAL at 08:51

## 2024-08-10 RX ADMIN — ASPIRIN 81 MG: 81 TABLET, COATED ORAL at 20:15

## 2024-08-10 RX ADMIN — SENNOSIDES AND DOCUSATE SODIUM 1 TABLET: 50; 8.6 TABLET ORAL at 20:15

## 2024-08-10 RX ADMIN — SENNOSIDES AND DOCUSATE SODIUM 1 TABLET: 50; 8.6 TABLET ORAL at 08:51

## 2024-08-10 RX ADMIN — ACETAMINOPHEN 650 MG: 325 TABLET, FILM COATED ORAL at 21:48

## 2024-08-10 RX ADMIN — ASPIRIN 81 MG: 81 TABLET, COATED ORAL at 08:51

## 2024-08-10 RX ADMIN — GABAPENTIN 200 MG: 100 CAPSULE ORAL at 20:15

## 2024-08-10 RX ADMIN — POLYETHYLENE GLYCOL 3350 17 G: 17 POWDER, FOR SOLUTION ORAL at 08:52

## 2024-08-10 RX ADMIN — BISACODYL 10 MG: 10 SUPPOSITORY RECTAL at 10:50

## 2024-08-10 RX ADMIN — ACETAMINOPHEN 650 MG: 325 TABLET, FILM COATED ORAL at 08:52

## 2024-08-10 ASSESSMENT — ACTIVITIES OF DAILY LIVING (ADL)
ADLS_ACUITY_SCORE: 36

## 2024-08-10 NOTE — PROGRESS NOTES
Orthopedic Surgery  Glo Gallagher  08/10/2024     Admit Date:  8/6/2024    POD: 3 Days Post-Op   Procedure(s):  LEFT HIP HEMIARTHROPLASTY  Acute left superior and inferior pubic rami fractures    Baseline dementia.  Patient resting comfortably in bed.  Alert  Denies pain to the left hip.  Denies spasms to the LLE.  Tolerating oral intake.    Denies nausea or vomiting.  Denies chest pain or shortness of breath.  No acute events overnight.    Temp:  [97.5  F (36.4  C)-98.5  F (36.9  C)] 98.1  F (36.7  C)  Pulse:  [63-80] 80  Resp:  [16-18] 18  BP: (108-118)/(59-61) 118/61  SpO2:  [93 %-94 %] 93 %    Alert and oriented to self.  Left hip Aquacel dressing is clean, dry, and intact.   Minimal erythema and ecchymosis of the surrounding skin.   Bilateral calves are soft, non-tender.  Left lower extremity is NVI.  Patient able to resist ankle dorsiflexion and plantar flexion bilaterally.  Able to flex and extend toes.  DP pulse palpable.  Sensation intact bilateral lower extremities.    Labs/Imaging:  Recent Labs   Lab Test 08/10/24  0750 08/09/24  0735 08/08/24  0714 08/07/24  0801 08/06/24  1647   WBC  --   --  9.0 11.2* 11.8*   HGB 10.6* 10.6* 11.6* 13.4 14.5   PLT  --   --  134* 171 182     Recent Labs   Lab Test 08/07/24  0801   INR 1.24*     A/P    1. S/p left hip hemiarthroplasty for management of a femoral neck fracture (DOS: 8/7/24); left superior and inferior pubic rami fractures (nonop), possible left greater trochanter fracture (nonop)  -Continue ASA 81 mg BID x 6 weeks for DVT prophylaxis. Monitor Hgb.  -Perioperative Ancef completed.  -Mobilize with PT/OT.  -WBAT LLE with walker.  -No left hip active abduction in the setting of possible nondisplaced greater trochanter fracture noted on postop imaging.    -Continue current pain regimen. Patient has not taken opioids on POD#1 or POD#2.  -Dressings: Keep intact. Okay for RN to change if >60% saturated or peeling/falling off.   -Follow-up: 2 weeks post-op with  Dr. Juan Ramirez    2. Disposition  -Anticipate d/c to TCU when medically cleared and progressing in PT. Ortho stable.      [facility]

## 2024-08-10 NOTE — PROGRESS NOTES
Care Management Follow Up    Length of Stay (days): 4    Expected Discharge Date: 08/10/2024     Concerns to be Addressed: discharge planning     Patient plan of care discussed at interdisciplinary rounds: Yes    Anticipated Discharge Disposition: Transitional Care     Anticipated Discharge Services: Transportation Services  Anticipated Discharge DME:      Patient/family educated on Medicare website which has current facility and service quality ratings:    Education Provided on the Discharge Plan:    Patient/Family in Agreement with the Plan: yes    Referrals Placed by CM/SW:    Private pay costs discussed: Not applicable    Additional Information:  Call placed to [facility] to see if they can accept over the weekend, voicemail left.     Patient also accepted at [facility], VM left with admissions.     Addendum 1045: Call received from Tanja at [facility]. They can take patient today, anytime after 1pm into a shared or private room ($45/daily). Discharge orders can be faxed to [phone] Writer paged dr. Hwang to see if patient is ready and was asked to plan discharge.     Spoke with Tanja in admissions at [facility]. Will plan for discharge tomorrow anytime after 11am.     PAS completed.    PAS-RR    D: Per DHS regulation, SW completed and submitted PAS-RR to [place] [employer] Direct Connect via the [employer] LinkAge Line.  PAS-RR confirmation # is : [id number]      P: Further questions may be directed to [facility] LinkAge Line at #[phone], option #4 for PAS-RR staff.     Met with patient at bedside and discussed acceptance at [facility]. Patient in agreement. Discussed shared vs. Private room. Called spouse as well while in the room to discussed. Confirmed shared room is preference. Patient confirmed she would like arranged. Spouse asked writer call son to confirm plan as well. Writer confirmed with patient this is okay. Call placed to son and left a voicemail. Writer called [facility] transport and spoke with Kate. Wheelchair  ride set for tomorrow 8/11 at [phone]. Charge RN updated on ride time. Call to [facility] and left  regarding ride time and preference for shared room.      Message received back from Tanja confirming plan for discharge tomorrow. Weekend contacts at [facility] for tomorrow are:  [phone] and fax orders and scripts  to [phone].    Spoke with Brandt (son) and confirmed plan for tomorrow discharge to [facility] via [facility] transport. Son in agreement with a shared room. He is asking patient get a bath/shower writer relayed to nursing. Son aware spouse is asking for a call from him to talk about the plan. Son appreciative of the call and update for discharge tomorrow.     MERARY Hodgson, [facility]  Social Work- Inpatient Care Coordination  [facility]

## 2024-08-10 NOTE — PROGRESS NOTES
[facility]  Hospitalist Progress Note        Rick Hwang MD   08/10/2024        Interval History:        - Care coordinator/SW following for disposition to TCU  - Ortiz catheter was placed for urinary retention (8/10), can probably have voiding trial in 3 to 4 days  - reports being constipated since surgery, will increase MiraLAX to BID; will order for suppository (8/10)         Assessment and Plan:        Glo Gallagher is a 89 year old female with a PMH of osteoporosis, hypothyroidism, neuropathy, prediabetes, cognitive decline, who was admitted on 8/6/2024 following a mechanical fall out of bed, with left hip and pelvic fracture.    She usually ambulates with the help of and apparently walker was not locked when she tried to grab it and had a fall.      Mechanical fall  Left femoral neck fracture with impaction, trauma related, osteoporosis related  Left superior/inferior pubic rami fractures  S/p left hip ruby-arthroplasty 8/7/24  Osteoporosis  - x-ray left femur (8/6) noted acute mildly displaced subcapital fracture of the left femoral neck  - x-ray pelvis (8/6) noted acute nondisplaced fractures of the left superior and  inferior pubic rami    - s/p left hip hemiarthroplasty 8/7/24; ortho following and plan for aspirin 81 mg BID  X 6 weeks for DVT prophylaxis; Follow-up: 2 weeks post-op with Dr. Juan Raimrez   - working with PT, rec TCU;  to follow for disposition  - pain control with cold packs, PRN pain meds, not needing narcotics  - PTA bisphosphonate held, resume per orthopedics    Likely postop acute blood loss anemia  - HB 14.5---13.4---11.6---10.6---10.6, stabilizing; likely some dilutional component in addition to postop acute blood loss anemia  - discontinue IVF (8/9)  - will monitor hemoglobin intermittently    Postop low urine output  Urinary retention  - low urine output since surgery; was straight cathed in PACU for 1000 ml  - needed several straight  "cath for urinary retention  - Ortiz catheter placed (8/10), can probably have voiding trial in 3 to 4 days  - discontinued IVF (8/9)    Constipation:  - reports being constipated since surgery, will increase MiraLAX to BID; will order for suppository (8/10); bowel regimen in place    Hyponatremia  Hypokalemia  - Na 127--- 126---129---131, improving with IV hydration; IVF d/jacey 8/9  - K 3.3, normal magnesium 1.9; supplementing per K replacement protocol   - monitor BMP intermittently     Hypothyroidism  - TSH slightly elevated at 5.39; free T4-- normal at 1.26  - repeat TFTs in 4 to 6 weeks  - continue PTA Synthroid 100 mcg daily    Hypertension  - continue PTA amlodipine 10 mg daily  - Hydralazine IV prn SBP consistently >160    History of cognitive impairment  -No PTA meds, intolerant past Aricept  -At risk of delirium  -As needed melatonin at bedtime  -Assure good sleep-wake cycles     History of prediabetes, peripheral neuropathy  - Last HgbA1c 6.1% 10/2023, no PTA meds.    - PCP follow-up  - PTA gabapentin 200mg 3 times daily    Diet: Advance Diet as Tolerated: Regular Diet Adult  Room Service      DVT Prophylaxis: PCD boots; Ortho plan for aspirin 81 mg BID  X 6 weeks for DVT prophylaxis    Code status: DNR/DNI    Disposition:   Medically Ready for Discharge: Anticipated Tomorrow pending clinical stability , TCU placement , orthopedic clearance  - SW consulted for disposition planning       Clinically Significant Risk Factors Present on Admission                      # Hypertension: Noted on problem list                    # Financial/Environmental Concerns: none              Page Me (7 am to 6 pm)              Physical Exam:      Blood pressure 114/59, pulse 63, temperature 97.5  F (36.4  C), temperature source Oral, resp. rate 18, height 1.626 m (5' 4\"), weight 57.6 kg (127 lb), SpO2 93%.  Vitals:    08/06/24 1036   Weight: 57.6 kg (127 lb)     Vital Signs with Ranges  Temp:  [97.5  F (36.4  C)-99.6  F (37.6 "  C)] 97.5  F (36.4  C)  Pulse:  [63-82] 63  Resp:  [16-18] 18  BP: (108-127)/(59-66) 114/59  SpO2:  [93 %-94 %] 93 %  I/O's Last 24 hours  I/O last 3 completed shifts:  In: -   Out: 500 [Urine:500]    Constitutional: Alert, awake and oriented; resting comfortably in no apparent distress       Oral cavity: Moist mucosa   Cardiovascular: Normal s1 s2, regular rate and rhythm, no murmur   Lungs: B/l clear to auscultation, no wheezes or crepitations   Abdomen: Soft, nt, nd, no guarding, rigidity or rebound; BS +   LE : No edema   Musculoskeletal/Neuro Power 5/5 in all extremities; No focal neurological deficits noted   Psychiatry: normal mood and affect  left hip dressing in place                Medications:        Current Facility-Administered Medications   Medication Dose Route Frequency Provider Last Rate Last Admin    acetaminophen (TYLENOL) tablet 650 mg  650 mg Oral Q6H Juan Live MD   650 mg at 08/09/24 2146    Or    acetaminophen (TYLENOL) Suppository 650 mg  650 mg Rectal Q6H WA Juan Ramirez MD        amLODIPine (NORVASC) tablet 10 mg  10 mg Oral Daily Juan Ramirez MD   10 mg at 08/09/24 0939    aspirin EC tablet 81 mg  81 mg Oral BID Juan Ramirez MD   81 mg at 08/09/24 2015    gabapentin (NEURONTIN) capsule 200 mg  200 mg Oral TID Juan Ramirez MD   200 mg at 08/09/24 2015    levothyroxine (SYNTHROID/LEVOTHROID) tablet 100 mcg  100 mcg Oral Daily Juan Ramirez MD   100 mcg at 08/09/24 0939    polyethylene glycol (MIRALAX) Packet 17 g  17 g Oral Daily Juan Ramirez MD   17 g at 08/09/24 0939    senna-docusate (SENOKOT-S/PERICOLACE) 8.6-50 MG per tablet 1 tablet  1 tablet Oral BID Juan Ramirez MD   1 tablet at 08/09/24 2015    sodium chloride (PF) 0.9% PF flush 3 mL  3 mL Intracatheter Q8H Juan Ramirez MD   3 mL at 08/09/24 2016     PRN Meds:   Current Facility-Administered Medications   Medication Dose Route Frequency Provider Last Rate Last Admin     acetaminophen (TYLENOL) tablet 650 mg  650 mg Oral Q4H PRN Juan Ramirez MD        benzocaine-menthol (CHLORASEPTIC) 6-10 MG lozenge 1 lozenge  1 lozenge Buccal Q1H PRN Juan Ramirez MD        bisacodyl (DULCOLAX) suppository 10 mg  10 mg Rectal Daily PRN Juan Ramirez MD        calcium carbonate (TUMS) chewable tablet 1,000 mg  1,000 mg Oral 4x Daily PRN Juan Ramirez MD        HOLD: ALL Anticoagulant medications until AFTER surgery   Does not apply HOLD Juan Ramirez MD        hydrALAZINE (APRESOLINE) injection 10 mg  10 mg Intravenous Q6H PRN Juan Ramirez MD        hydrALAZINE (APRESOLINE) tablet 10 mg  10 mg Oral Q4H PRN Juan Ramirez MD        HYDROmorphone (DILAUDID) half-tab 1 mg  1 mg Oral Q4H PRN Juan Ramirez MD        Or    HYDROmorphone (DILAUDID) tablet 2 mg  2 mg Oral Q4H PRN Juan Ramirez MD        HYDROmorphone (DILAUDID) injection 0.2 mg  0.2 mg Intravenous Q2H PRN Juna Ramirez MD        Or    HYDROmorphone (DILAUDID) injection 0.4 mg  0.4 mg Intravenous Q2H PRN Juan Ramirez MD        ibuprofen (ADVIL/MOTRIN) tablet 600 mg  600 mg Oral Q6H PRN Juan Ramirez MD        lidocaine (LMX4) cream   Topical Q1H PRN Juan Ramirez MD        lidocaine 1 % 0.1-1 mL  0.1-1 mL Other Q1H PRN Juan Ramirez MD        magnesium hydroxide (MILK OF MAGNESIA) suspension 30 mL  30 mL Oral Daily PRN Juan Ramirez MD        melatonin tablet 1 mg  1 mg Oral At Bedtime PRN Juan Ramirez MD   1 mg at 08/09/24 2447    methocarbamol (ROBAXIN) half-tab 250 mg  250 mg Oral TID PRN Juan Ramirez MD        naloxone (NARCAN) injection 0.2 mg  0.2 mg Intravenous Q2 Min PRN Juan Ramirez MD        Or    naloxone (NARCAN) injection 0.4 mg  0.4 mg Intravenous Q2 Min PRN Juan Ramirez MD        Or    naloxone (NARCAN) injection 0.2 mg  0.2 mg Intramuscular Q2 Min PRN Juan Ramirez MD        Or    naloxone (NARCAN) injection 0.4 mg  0.4  "mg Intramuscular Q2 Min PRN Juan Ramirez MD        ondansetron (ZOFRAN ODT) ODT tab 4 mg  4 mg Oral Q6H PRN Juan Ramirez MD        Or    ondansetron (ZOFRAN) injection 4 mg  4 mg Intravenous Q6H PRN Juan Rmairez MD        oxyCODONE (ROXICODONE) tablet 5 mg  5 mg Oral Q4H PRN Juan Ramirez MD        Or    oxyCODONE (ROXICODONE) tablet 10 mg  10 mg Oral Q4H PRN Juan Ramirez MD        prochlorperazine (COMPAZINE) injection 5 mg  5 mg Intravenous Q6H PRN Juan Ramirez MD        Or    prochlorperazine (COMPAZINE) tablet 5 mg  5 mg Oral Q6H PRN Juan Ramirez MD        Or    prochlorperazine (COMPAZINE) suppository 12.5 mg  12.5 mg Rectal Q12H PRN Juan Ramirez MD        senna-docusate (SENOKOT-S/PERICOLACE) 8.6-50 MG per tablet 1 tablet  1 tablet Oral BID PRN Juan Ramirez MD        Or    senna-docusate (SENOKOT-S/PERICOLACE) 8.6-50 MG per tablet 2 tablet  2 tablet Oral BID PRN Juan Ramirez MD        sodium chloride (PF) 0.9% PF flush 3 mL  3 mL Intracatheter q1 min prn Juan Ramirez MD                Data:      All new lab and imaging data was reviewed.   Recent Labs   Lab Test 08/09/24  0735 08/08/24  0714 08/07/24  0801 08/06/24  1647   WBC  --  9.0 11.2* 11.8*   HGB 10.6* 11.6* 13.4 14.5   MCV  --  88 87 87   PLT  --  134* 171 182   INR  --   --  1.24*  --       Recent Labs   Lab Test 08/09/24  0735 08/08/24  0714 08/07/24  2125 08/07/24  1521 08/07/24  0801   * 129*  --  129* 126*   POTASSIUM 3.7 3.7  --  3.8 3.3*   CHLORIDE 99 99  --   --  92*   CO2 26 21*  --   --  23   BUN 8.2 11.3  --   --  12.0   CR 0.46* 0.44*  --  0.42* 0.45*   ANIONGAP 6* 9  --   --  11   NGOC 7.8* 7.9*  --   --  8.6*   * 116* 155*  --  120*     No lab results found.    Invalid input(s): \"TROP\", \"TROPONINIES\"      "

## 2024-08-10 NOTE — PLAN OF CARE
Goal Outcome Evaluation:  Date/Time 08/10/24    Trauma/Ortho/Medical (Choose one) Ortho    Diagnosis: L ruby hip   POD#: 4  Mental Status: A&Ox4 forgetful  Activity/dangle up with 2 GB and rose steady  Diet: reg  Pain: Tylenol  Sellers/Voiding: sellers  Tele/Restraints/Iso: NA  02/LDA: RANAYLA SL  D/C Date: TBD  Other Info: discharge to TCU

## 2024-08-10 NOTE — PLAN OF CARE
Goal Outcome Evaluation:                   Summary: 8-10-24 [id number]  Goal Outcome Evaluation:  Diagnosis: S/P fall - left hip hemiarthroplasty  POD#: 3  Mental Status: A/Ox3-4 - forgetful at times  Activity/dangle 1-2 assist/GB/walker  Pain: denies - scheduled Tylenol given  Ortiz/Voiding: bladder scan 630 ml, placed Ortiz for retention   02/LDA: RA/, IV SL  D/C Date: possibly in Am

## 2024-08-11 ENCOUNTER — APPOINTMENT (OUTPATIENT)
Dept: PHYSICAL THERAPY | Facility: CLINIC | Age: 89
DRG: 522 | End: 2024-08-11
Payer: COMMERCIAL

## 2024-08-11 VITALS
HEART RATE: 87 BPM | OXYGEN SATURATION: 98 % | SYSTOLIC BLOOD PRESSURE: 114 MMHG | DIASTOLIC BLOOD PRESSURE: 73 MMHG | RESPIRATION RATE: 16 BRPM | BODY MASS INDEX: 21.68 KG/M2 | TEMPERATURE: 98.3 F | WEIGHT: 127 LBS | HEIGHT: 64 IN

## 2024-08-11 LAB
HGB BLD-MCNC: 12 G/DL (ref 11.7–15.7)
POTASSIUM SERPL-SCNC: 3.8 MMOL/L (ref 3.4–5.3)

## 2024-08-11 PROCEDURE — 36415 COLL VENOUS BLD VENIPUNCTURE: CPT | Performed by: HOSPITALIST

## 2024-08-11 PROCEDURE — 99239 HOSP IP/OBS DSCHRG MGMT >30: CPT | Performed by: HOSPITALIST

## 2024-08-11 PROCEDURE — 250N000013 HC RX MED GY IP 250 OP 250 PS 637: Performed by: STUDENT IN AN ORGANIZED HEALTH CARE EDUCATION/TRAINING PROGRAM

## 2024-08-11 PROCEDURE — 97530 THERAPEUTIC ACTIVITIES: CPT | Mod: GP | Performed by: PHYSICAL THERAPY ASSISTANT

## 2024-08-11 PROCEDURE — 84132 ASSAY OF SERUM POTASSIUM: CPT | Performed by: HOSPITALIST

## 2024-08-11 PROCEDURE — 85018 HEMOGLOBIN: CPT | Performed by: HOSPITALIST

## 2024-08-11 RX ORDER — OXYCODONE HYDROCHLORIDE 5 MG/1
2.5 TABLET ORAL EVERY 6 HOURS PRN
Qty: 6 TABLET | Refills: 0 | Status: SHIPPED | OUTPATIENT
Start: 2024-08-11 | End: 2024-08-14

## 2024-08-11 RX ADMIN — ACETAMINOPHEN 650 MG: 325 TABLET, FILM COATED ORAL at 08:26

## 2024-08-11 RX ADMIN — POLYETHYLENE GLYCOL 3350 17 G: 17 POWDER, FOR SOLUTION ORAL at 08:24

## 2024-08-11 RX ADMIN — ASPIRIN 81 MG: 81 TABLET, COATED ORAL at 08:26

## 2024-08-11 RX ADMIN — GABAPENTIN 200 MG: 100 CAPSULE ORAL at 08:25

## 2024-08-11 RX ADMIN — SENNOSIDES AND DOCUSATE SODIUM 1 TABLET: 50; 8.6 TABLET ORAL at 08:26

## 2024-08-11 RX ADMIN — LEVOTHYROXINE SODIUM 100 MCG: 100 TABLET ORAL at 08:26

## 2024-08-11 RX ADMIN — AMLODIPINE BESYLATE 10 MG: 10 TABLET ORAL at 08:24

## 2024-08-11 ASSESSMENT — ACTIVITIES OF DAILY LIVING (ADL)
ADLS_ACUITY_SCORE: 36
ADLS_ACUITY_SCORE: 35
ADLS_ACUITY_SCORE: 36
ADLS_ACUITY_SCORE: 35
ADLS_ACUITY_SCORE: 36
ADLS_ACUITY_SCORE: 36

## 2024-08-11 NOTE — PROGRESS NOTES
Patient seen and examined    - Care coordinator/SW following for disposition to TCU  - had a bowel movement 8/10/24  - no other acute issues overnight    Discharge to TCU today    Please see discharge summary for details

## 2024-08-11 NOTE — PLAN OF CARE
Goal Outcome Evaluation:                      Summary: 8-11-24 [id number]  Diagnosis: L ruby hip   POD#: 4  Mental Status: A&Ox4 forgetful  Activity/dangle up with 2 GB and rose steady  Diet: reg  Pain: Tylenol, melatonin   Sellers/Voiding: sellers  Tele/Restraints/Iso: NA  02/LDA: NAYLA SANDOVAL  D/C Date: TBD  Other Info: TCU possibly today

## 2024-08-11 NOTE — DISCHARGE SUMMARY
Patient vital signs are at baseline: Yes, RA  Patient able to ambulate as they were prior to admission or with assist devices provided by therapies during their stay:  Yes, Up with 1-2GB/W  Patient MUST void prior to discharge:  No, discharge with Sellers due to retention  Patient able to tolerate oral intake:  Yes  Pain has adequate pain control using Oral analgesics:  Yes  Does patient have an identified :  Yes  Has goal D/C date and time been discussed with patient:  Yes, [facility] via EMS W/C.    Patient POD # 4 from L Hip Hemiarthroplasty, DCI. Pain as well managed with Schedule medications. Pt A&Ox4, forgetful at times. AVS printed by [facility] included is the Oxycodone script was handed to EMS. Pt discharge with sellers, was intact and patent. Belonging returned to Pt.    How Severe Are Your Spot(S)?: moderate Have Your Spot(S) Been Treated In The Past?: has not been treated Hpi Title: Evaluation of Skin Lesions Additional History: Full body skin check

## 2024-08-11 NOTE — PROGRESS NOTES
Care Management Discharge Note    Discharge Date: 08/11/2024       Discharge Disposition: Transitional Care    Discharge Services: Transportation Services    Discharge DME:      Discharge Transportation: agency, other (see comments) (does not think she can get into family car; arrange wheelchair transportation)    Private pay costs discussed: transportation costs    Does the patient's insurance plan have a 3 day qualifying hospital stay waiver?  Yes     Which insurance plan 3 day waiver is available? Alternative insurance waiver    Will the waiver be used for post-acute placement? Yes    PAS Confirmation Code:  [id number]  Patient/family educated on Medicare website which has current facility and service quality ratings:  yes    Education Provided on the Discharge Plan:  yes  Persons Notified of Discharge Plans: Patient, family, HUC, Charge RN  Patient/Family in Agreement with the Plan: yes    Handoff Referral Completed: Yes    Additional Information:  Writer paged  and he confirmed patient should be ready for discharge today and will work on orders. Transport arranged for today between [phone]. Writer updated patient at bedside regarding plan for discharge today to [facility]. Writer explained family was updated yesterday regarding his. Patient is pleased with the plan and in agreement. Charge RN updated on ride time and will relay to bedside RNKim.    Discharge Orders received and faxed via Communication tab to [facility] at [phone]. Writer called [facility] and spoke with Thierry. He confirmed they are aware of discharge for today and time and are in agreement. Family updated on 8/10 regarding plan for today and were in agreement.     Confirmed with Louisa at [facility] that they received discharge orders there were faxed. Script was also faxed over.     Addendum 1130: message from  conforming he updated orders. Updated orders faxed to [facility] at [phone]    MERARY Hodgson, Franklin Memorial HospitalSW  Social  Work- Inpatient Care Coordination  [facility]

## 2024-08-11 NOTE — DISCHARGE SUMMARY
[facility]  Discharge Summary        Glo Gallagher MRN# [id number]   YOB: 1933 Age: 89 year old     Date of Admission:  8/6/2024  Date of Discharge:  8/11/2024  Admitting Physician:  Rick Hwang MD  Discharge Physician: Rick Hwang MD  Discharging Service: Hospitalist     Primary Provider: Sweetie Ramirez  Primary Care Physician Phone Number: [phone]         Discharge Diagnoses/Problem Oriented Hospital Course (Providers):    Glo Gallagher was admitted on 8/6/2024 by Rick Hwang MD and I would refer you to their history and physical.  The following problems were addressed during her hospitalization:    Glo Gallagher is a 89 year old female with a PMH of osteoporosis, hypothyroidism, neuropathy, prediabetes, cognitive decline, who was admitted on 8/6/2024 following a mechanical fall out of bed, with left hip and pelvic fracture.     She usually ambulates with the help of and apparently walker was not locked when she tried to grab it and had a fall.      Mechanical fall  Left femoral neck fracture with impaction, trauma related, osteoporosis related  Left superior/inferior pubic rami fractures  S/p left hip ruby-arthroplasty 8/7/24  Osteoporosis  - x-ray left femur (8/6) noted acute mildly displaced subcapital fracture of the left femoral neck  - x-ray pelvis (8/6) noted acute nondisplaced fractures of the left superior and  inferior pubic rami     - s/p left hip hemiarthroplasty 8/7/24; ortho followed and plan for aspirin 81 mg BID  X 6 weeks for DVT prophylaxis; Follow-up: 2 weeks post-op with Dr. Juan Ramirez   - worked with PT, rec TCU;  to follow for disposition  - pain control with cold packs, PRN pain meds, not needing narcotics  - PTA bisphosphonate held on admission, resume per orthopedics     Likely postop acute blood loss anemia  - HB 14.5---13.4---11.6---10.6---10.6---12, stabilizing; likely some dilutional component  in addition to postop acute blood loss anemia  - discontinued IVF (8/9)     Postop low urine output  Urinary retention  - low urine output since surgery; was straight cathed in PACU for 1000 ml  - needed several straight cath for urinary retention  - Ortiz catheter placed (8/10), can probably have voiding trial in 3 to 4 days at TCU  - discontinued IVF (8/9)     Constipation:  - reports being constipated since surgery; had a bowel movement after suppository on 8/10/24; has bowel regimen in place     Hyponatremia  Hypokalemia  - Na 127--- 126---129---131, improving with IV hydration; IVF d/jacey 8/9  - K 3.3, normal magnesium 1.9; supplemented per K replacement protocol   - monitor BMP at TCU     Hypothyroidism  - TSH slightly elevated at 5.39; free T4-- normal at 1.26  - repeat TFTs in 4 to 6 weeks  - continue PTA Synthroid 100 mcg daily     Hypertension  - continue PTA amlodipine 10 mg daily  - Hydralazine IV prn SBP consistently >160     History of cognitive impairment  -No PTA meds, intolerant past Aricept  -At risk of delirium  -As needed melatonin at bedtime  -Assure good sleep-wake cycles     History of prediabetes, peripheral neuropathy  - Last HgbA1c 6.1% 10/2023, no PTA meds.    - PCP follow-up  - PTA gabapentin 200mg 3 times daily     DVT Prophylaxis: Ortho plan for aspirin 81 mg BID  X 6 weeks for DVT prophylaxis     Code status: DNR/DNI       Clinically Significant Risk Factors                    # Hypertension: Noted on problem list                     # Financial/Environmental Concerns: none                                 Pending Results:        Unresulted Labs Ordered in the Past 30 Days of this Admission       No orders found from 7/7/2024 to 8/7/2024.                 Discharge Instructions and Follow-Up:      Follow-up Appointments     Follow Up and recommended labs and tests      Please call as soon as possible to make an appointment to be seen in Dr. Juan Ramirez's clinic at 2 weeks postop  for a recheck of your surgical   site, possible repeat x-rays, and wound care. Please contact Dr. Ramirez's team at [phone] to schedule an appointment.       Dr. Ramirez sees patients at 2 clinic locations:  [facility] [place] [address] [place] [address], [place] [address]  [facility] [address], [address], [place] [address]      Please call the on-call phone number [phone] during evenings, nights   and weekends for any urgent needs.        Follow Up and recommended labs and tests      Follow up with senior care physician.  The following labs/tests are   recommended: repeat BMP and Hb in 2-3 days..                 Discharge Disposition:      Discharged to short-term care facility         Discharge Medications:        Current Discharge Medication List        START taking these medications    Details   acetaminophen (TYLENOL) 325 MG tablet Take 2 tablets (650 mg) by mouth every 6 hours as needed for other (For optimal non-opioid multimodal pain management to improve pain control.)    Associated Diagnoses: Closed fracture of neck of left femur, initial encounter (H)      aspirin 81 MG EC tablet Take 1 tablet (81 mg) by mouth 2 times daily for 42 days    Associated Diagnoses: Closed fracture of neck of left femur, initial encounter (H)      oxyCODONE (ROXICODONE) 5 MG tablet Take 0.5 tablets (2.5 mg) by mouth every 6 hours as needed for moderate to severe pain or breakthrough pain  Qty: 6 tablet, Refills: 0    Associated Diagnoses: Closed displaced fracture of left femoral neck (H)      polyethylene glycol (MIRALAX) 17 GM/Dose powder Take 17 g by mouth daily    Associated Diagnoses: Closed fracture of neck of left femur, initial encounter (H)      senna-docusate (SENOKOT-S/PERICOLACE) 8.6-50 MG tablet Take 1 tablet by mouth 2 times daily as needed for constipation    Associated Diagnoses: Closed fracture of neck of left femur, initial encounter (H)        "    CONTINUE these medications which have NOT CHANGED    Details   amLODIPine (NORVASC) 10 MG tablet Take 10 mg by mouth daily      gabapentin (NEURONTIN) 100 MG capsule Take 200 mg by mouth 3 times daily      levothyroxine (SYNTHROID/LEVOTHROID) 100 MCG tablet Take 100 mcg by mouth daily      OVER-THE-COUNTER STEPHANY POT AS NEEDED FOR ALLERGIES      VITAMIN D 1000 UNIT PO TABS Take 2 tablets by mouth daily      ORDER FOR DME \"superfeet\" orthotics: [place]  Qty: 1 pair, Refills: 0    Associated Diagnoses: Pain in limb; Enthesopathy of unspecified site           STOP taking these medications       BENADRYL 25 MG OR TABS Comments:   Reason for Stopping:         CHLOR-TABLETS 4 MG OR TABS Comments:   Reason for Stopping:                    Allergies:         Allergies   Allergen Reactions    Morphine And Codeine      States she almost passed out from medicine     Sulfa Antibiotics      Unknown reaction            Consultations This Hospital Stay:      Consultation during this admission received from orthopedics         Condition and Physical Exam on Discharge:        Discharge condition: Stable   Discharge vitals: Blood pressure 114/73, pulse 87, temperature 98.3  F (36.8  C), temperature source Oral, resp. rate 16, height 1.626 m (5' 4\"), weight 57.6 kg (127 lb), SpO2 98%.       Constitutional: Alert, awake and oriented but forgetful; resting comfortably in no apparent distress         Oral cavity: Moist mucosa   Cardiovascular: Normal s1 s2, regular rate and rhythm, no murmur   Lungs: B/l clear to auscultation, no wheezes or crepitations   Abdomen: Soft, nt, nd, no guarding, rigidity or rebound; BS +   LE : No edema   Musculoskeletal/Neuro Power 5/5 in all extremities; No focal neurological deficits noted   Psychiatry: normal mood and affect  left hip dressing in place               Discharge Orders for Skilled Facility (from Discharge Orders):        After Care Instructions       Activity - Up with assistive " device      Activity - Up with nursing assistance      Additional Discharge Instructions      Pain after surgery is normal and expected.  You will have some amount of pain and swelling for several weeks after surgery.  These symptoms will improve with time.  There are several things you can do to help reduce your pain including: rest, cold compresses, elevation, and using pain medications as needed. Contact your Surgeon Team if you have pain that persists or worsens after surgery despite rest, ice, elevation, and taking your medication(s) as prescribed. Contact your Surgeon Team if you have new numbness, tingling, or weakness in your operative extremity.    Swelling and/or bruising of the surgical extremity is common and may persist for several months after surgery. In addition to frequent icing and elevation, gentle compressive support with an ACE wrap or tubigrip may help with swelling. Apply compression regularly, removing at least twice daily to perform skin checks. Contact your Surgeon Team if your swelling increases and is NOT associated with an increase in your activity level, or if your swelling increases and is associated with redness and pain.    Ice can be used to control swelling and discomfort after surgery. Place a thin towel over your operative site and apply the ice pack overtop. Leave ice pack in place for 20 minutes, then remove for 20 minutes. Repeat this 20 minutes on/20 minutes off routine as often as tolerated.    Please contact your surgical team with any concerns for infection including increasing redness around your surgical site, pus-like drainage, fever, chills, or flu-like symptoms.        Diet      Follow this diet upon discharge: Regular Diet Adult        Fall precautions      Fall precautions      Ortiz catheter      To straight gravity drainage. To have trial of void in 3-4 days.        General info for SNF      Length of Stay Estimate: Short Term Care: Estimated # of Days  <30  Condition at Discharge: Stable  Level of care:skilled   Rehabilitation Potential: Fair  Admission H&P remains valid and up-to-date: Yes  Recent Chemotherapy: N/A  Use Nursing Home Standing Orders: Yes        Hip precautions      No active left hip abduction        Mantoux instructions      Give two-step Mantoux (PPD) Per Facility Policy Yes        Weight bearing status      WBAT LLE with walker        Wound care      Site: Left hip  Instructions:  Please leave dressing intact for 2 weeks postoperatively. Okay to change if saturated >60% or peeling. If an Aquacel or gauze/tegaderm is in place over your surgical sites, it is okay to shower without covering the dressings. If you have a soft dressing, you must securely cover your dressing while showering or sponge bathe. Absolutely no soaking or submersion. Please contact your orthopedic surgical team if persistent drainage or redness develops around the surgical site.                     Rehab orders for Skilled Facility (from Discharge Orders):      Referrals     Future Labs/Procedures    Occupational Therapy Adult Consult     Comments:    Evaluate and treat as clinically indicated.    Reason: S/p left hip hemiarthroplasty (DOS: 8/7/24) performed by Dr. Juan Ramirez, possible left greater trochanter fracture, and acute   left inferior and superior pubic rami fractures    Physical Therapy Adult Consult     Comments:    Evaluate and treat as clinically indicated.    Reason: S/p left hip hemiarthroplasty (DOS: 8/7/24) performed by Dr. Juan Ramirez, possible left greater trochanter fracture, and acute   left inferior and superior pubic rami fractures             Discharge Time:      Greater than 30 minutes.        Image Results From This Hospital Stay (For Non-EPIC Providers):        Results for orders placed or performed during the hospital encounter of 08/06/24   XR Pelvis 1/2 Views    Narrative    XR PELVIS 1/2 VIEWS  8/6/2024 3:28 PM     HISTORY:  fall, hip pain  COMPARISON: None      Impression    IMPRESSION: Acute nondisplaced fractures of the left superior and  inferior pubic rami. Acute appearing left femoral neck fracture with  impaction. There is normal joint alignment. Mild bilateral hip joint  degenerative changes. Moderate bilateral sacroiliac joint degenerative  changes. Severe degenerative changes of the lower lumbar spine.  Osteopenia.    NOTE: ABNORMAL REPORT    THE DICTATION ABOVE DESCRIBES AN ABNORMAL REPORT FOR WHICH FOLLOW-UP  IS NEEDED.    MADDIE SAWYER MD         SYSTEM ID:  [id number]   XR Femur Left 2 Views    Narrative    EXAM: XR FEMUR LEFT 2 VIEWS  LOCATION: [facility]  DATE: 8/6/2024    INDICATION: Left femoral neck fracture, full length x rays for surgical planning  COMPARISON: None.      Impression    IMPRESSION: Acute mildly displaced subcapital fracture of the left femoral neck. The trochanters remain intact. Bones are demineralized. Maintained joint spacing in the left hip. Moderate-advanced tricompartmental degenerative arthritic changes in the   left knee.   XR Chest 1 View    Narrative    EXAM: XR CHEST 1 VIEW  LOCATION: [facility]  DATE: 8/6/2024    INDICATION: preop screen  COMPARISON: None.      Impression    IMPRESSION: Heart and mediastinal size normal. The lungs are hyperexpanded which can be seen with COPD. Lungs and pleural spaces are clear. No pleural effusion or pneumothorax.   XR Pelvis w Hip Port Left  1 View    Narrative    EXAM: XR PELVIS AND HIP PORTABLE LEFT 1 VIEW  LOCATION: [facility]  DATE: 8/7/2024    INDICATION: Status post hip surgery.  COMPARISON: None.      Impression    IMPRESSION:  1. Comminuted left superior and inferior pubic ramus fractures, significantly more displaced than on the preoperative study. Findings relayed to the patient's nurse, Mercedes Enriquez, by Angelica from the QC Department by phone on 8/7/2024 at  "1946 hours.  2. Interval bipolar left hip hemiarthroplasty.  3. No other change.           Most Recent Lab Results In EPIC (For Non-EPIC Providers):    Most Recent 3 CBC's:  Recent Labs   Lab Test 08/10/24  0750 08/09/24  0735 08/08/24  0714 08/07/24  0801 08/06/24  1647   WBC  --   --  9.0 11.2* 11.8*   HGB 10.6* 10.6* 11.6* 13.4 14.5   MCV  --   --  88 87 87   PLT  --   --  134* 171 182      Most Recent 3 BMP's:  Recent Labs   Lab Test 08/10/24  0750 08/09/24  0735 08/08/24 0714 08/07/24  2125 08/07/24  1521 08/07/24  0801   NA  --  131* 129*  --  129* 126*   POTASSIUM 3.7 3.7 3.7  --  3.8 3.3*   CHLORIDE  --  99 99  --   --  92*   CO2  --  26 21*  --   --  23   BUN  --  8.2 11.3  --   --  12.0   CR  --  0.46* 0.44*  --  0.42* 0.45*   ANIONGAP  --  6* 9  --   --  11   NGOC  --  7.8* 7.9*  --   --  8.6*   GLC  --  119* 116* 155*  --  120*     Most Recent 3 Troponin's:No lab results found.    Invalid input(s): \"TROP\", \"TROPONINIES\"  Most Recent 3 INR's:  Recent Labs   Lab Test 08/07/24  0801   INR 1.24*     Most Recent 2 LFT's:No lab results found.  Most Recent Cholesterol Panel:No lab results found.  Most Recent 6 Bacteria Isolates From Any Culture (See EPIC Reports for Culture Details):No lab results found.  Most Recent TSH, T4 and HgbA1c:  Recent Labs   Lab Test 08/07/24  0801 08/06/24  1647   TSH  --  5.39*   T4 1.26  --          "

## 2024-08-11 NOTE — PROGRESS NOTES
Orthopedic Surgery  Glo Gallagher  08/11/2024     Admit Date:  8/6/2024    POD: 4 Days Post-Op   Procedure(s):  LEFT HIP HEMIARTHROPLASTY  Acute left superior and inferior pubic rami fractures    Baseline dementia.  Patient resting comfortably in bed.  Alert  Denies pain to the left hip.  Denies spasms to the LLE.  Tolerating oral intake.    Denies nausea or vomiting.  Denies chest pain or shortness of breath.  No acute events overnight.    Temp:  [97.6  F (36.4  C)-98.3  F (36.8  C)] 98.3  F (36.8  C)  Pulse:  [75-87] 87  Resp:  [16-18] 16  BP: (114-125)/(60-73) 114/73  SpO2:  [92 %-98 %] 98 %    Alert and oriented to self.  Left hip Aquacel dressing is clean, dry, and intact.   Minimal erythema and ecchymosis of the surrounding skin.   Bilateral calves are soft, non-tender.  Left lower extremity is NVI.  Patient able to resist ankle dorsiflexion and plantar flexion bilaterally.  Able to flex and extend toes.  DP pulse palpable.  Sensation intact bilateral lower extremities.    Labs/Imaging:  Recent Labs   Lab Test 08/10/24  0750 08/09/24  0735 08/08/24  0714 08/07/24  0801 08/06/24  1647   WBC  --   --  9.0 11.2* 11.8*   HGB 10.6* 10.6* 11.6* 13.4 14.5   PLT  --   --  134* 171 182     Recent Labs   Lab Test 08/07/24  0801   INR 1.24*     A/P    1. S/p left hip hemiarthroplasty for management of a femoral neck fracture (DOS: 8/7/24); left superior and inferior pubic rami fractures (nonop), possible left greater trochanter fracture (nonop)  -Continue ASA 81 mg BID x 6 weeks for DVT prophylaxis.   -Mobilize with PT/OT.  -WBAT LLE with walker.  -No left hip active abduction in the setting of possible nondisplaced greater trochanter fracture noted on postop imaging.    -Continue current pain regimen. Patient has not taken opioids  -Dressings: Keep intact. Okay for RN to change if >60% saturated or peeling/falling off.   -Follow-up: 2 weeks post-op with Dr. Juan Ramirez    2. Disposition  -Anticipate d/c to [facility]  when medically cleared and progressing in PT. Ortho stable.  Planning on discharge today      [facility]

## 2024-08-12 ENCOUNTER — PATIENT OUTREACH (OUTPATIENT)
Dept: CARE COORDINATION | Facility: CLINIC | Age: 89
End: 2024-08-12
Payer: COMMERCIAL

## 2024-08-12 NOTE — PROGRESS NOTES
Connected Care Resource Center: Connected [facility] Resource [place]    Background: Transitional Care Management program identified per system criteria and reviewed by Connected Care Resource Center team for possible outreach.    Assessment: Upon chart review, CCRC Team member will not proceed with patient outreach related to this episode of Transitional Care Management program due to reason below:    Non-[facility] TCU: CCRC team member noted patient discharged to TCU/ARU/LTACH. Patient is not established with a [facility] Primary Care Clinic currently supported by Primary Care-Care Coordination therefore handoff to Primary Care-Care Coordination is not appropriate at this time.    Plan: Transitional Care Management episode addressed appropriately per reason noted above.      SUSAN Dave  [facility], [facility]    *Connected Care Resource Team does NOT follow patient ongoing. Referrals are identified based on internal discharge reports and the outreach is to ensure patient has an understanding of their discharge instructions.

## (undated) DEVICE — SU VICRYL 0 CT-1 27" J340H

## (undated) DEVICE — GLOVE BIOGEL PI MICRO SZ 8.0 48580

## (undated) DEVICE — DRSG AQUACEL AG HYDROFIBER  3.5X10" 422605

## (undated) DEVICE — SU DERMABOND PRINEO 22CM CLR222US

## (undated) DEVICE — MANIFOLD NEPTUNE 4 PORT 700-20

## (undated) DEVICE — SU VICRYL 1 MO-4 18" J702D

## (undated) DEVICE — LINEN TOWEL PACK X5 5464

## (undated) DEVICE — BONE CLEANING TIP INTERPULSE FEMORAL CANAL 0210-008-000

## (undated) DEVICE — SOLUTION WOUND CLEANSING 3/4OZ 10% PVP EA-L3011FB-50

## (undated) DEVICE — SOL NACL 0.9% IRRIG 3000ML BAG 2B7477

## (undated) DEVICE — DRAPE STERI U 1015

## (undated) DEVICE — DRAPE CONVERTORS U-DRAPE 60X72" 8476

## (undated) DEVICE — NDL SPINAL 18GA 3.5" 405184

## (undated) DEVICE — CEMENT PRESSURIZER FEMORAL CANAL MED 0206-546-000

## (undated) DEVICE — PACK TOTAL HIP W/POUCH SOP15HPFSM

## (undated) DEVICE — BONE CLEANING TIP INTERPULSE  0210-010-000

## (undated) DEVICE — SUCTION IRR SYSTEM W/O TIP INTERPULSE HANDPIECE 0210-100-000

## (undated) DEVICE — GLOVE BIOGEL PI MICRO INDICATOR UNDERGLOVE SZ 8.0 48980

## (undated) DEVICE — SU ETHIBOND 5 V-37 4X30" MB66G

## (undated) DEVICE — SU STRATAFIX PDS PLUS 2 CTX SPIRAL L14 IN SXPP2B405

## (undated) DEVICE — BLADE SAW SAGITTAL STRK 18X90X1.19MM HD SYS 6 6118-119-090

## (undated) DEVICE — DRAPE U SPLIT 74X120" 29440

## (undated) DEVICE — SOL WATER IRRIG 1000ML BOTTLE 2F7114

## (undated) DEVICE — SU VICRYL 2-0 CT-1 27" UND J259H

## (undated) DEVICE — HOOD SURG T7PLUS PEEL AWAY FACE SHIELD STRL LF 0416-801-100

## (undated) DEVICE — SU ETHICON STRATAFIX SPR PS 3-0 30X30CM SXMP2B412

## (undated) DEVICE — SOL NACL 0.9% IRRIG 1000ML BOTTLE 2F7124

## (undated) DEVICE — PAD FOAM MCGUIRE KIT 0814-0150

## (undated) DEVICE — BONE CEMENT MIXEVAC HI VAC W/CARTRIDGE 0306-563-000

## (undated) DEVICE — ESU GROUND PAD UNIVERSAL W/O CORD

## (undated) RX ORDER — ONDANSETRON 2 MG/ML
INJECTION INTRAMUSCULAR; INTRAVENOUS
Status: DISPENSED
Start: 2024-08-07

## (undated) RX ORDER — PROPOFOL 10 MG/ML
INJECTION, EMULSION INTRAVENOUS
Status: DISPENSED
Start: 2024-08-07

## (undated) RX ORDER — EPHEDRINE SULFATE 50 MG/ML
INJECTION, SOLUTION INTRAMUSCULAR; INTRAVENOUS; SUBCUTANEOUS
Status: DISPENSED
Start: 2024-08-07

## (undated) RX ORDER — FENTANYL CITRATE 50 UG/ML
INJECTION, SOLUTION INTRAMUSCULAR; INTRAVENOUS
Status: DISPENSED
Start: 2024-08-07

## (undated) RX ORDER — VANCOMYCIN HYDROCHLORIDE 1 G/20ML
INJECTION, POWDER, LYOPHILIZED, FOR SOLUTION INTRAVENOUS
Status: DISPENSED
Start: 2024-08-07